# Patient Record
Sex: FEMALE | Race: WHITE | NOT HISPANIC OR LATINO | Employment: FULL TIME | URBAN - METROPOLITAN AREA
[De-identification: names, ages, dates, MRNs, and addresses within clinical notes are randomized per-mention and may not be internally consistent; named-entity substitution may affect disease eponyms.]

---

## 2018-09-25 ENCOUNTER — HOSPITAL ENCOUNTER (EMERGENCY)
Facility: HOSPITAL | Age: 28
Discharge: HOME | End: 2018-09-25
Attending: EMERGENCY MEDICINE | Admitting: EMERGENCY MEDICINE
Payer: MEDICAID

## 2018-09-25 VITALS
OXYGEN SATURATION: 100 % | TEMPERATURE: 99 F | HEART RATE: 92 BPM | DIASTOLIC BLOOD PRESSURE: 71 MMHG | RESPIRATION RATE: 18 BRPM | SYSTOLIC BLOOD PRESSURE: 105 MMHG | BODY MASS INDEX: 19.99 KG/M2 | WEIGHT: 120 LBS | HEIGHT: 65 IN

## 2018-09-25 DIAGNOSIS — O20.9 VAGINAL BLEEDING BEFORE 22 WEEKS GESTATION: Primary | ICD-10-CM

## 2018-09-25 LAB
ABO + RH BLD: NORMAL
B-HCG UR QL: POSITIVE
BACTERIA #/AREA URNS AUTO: NORMAL /HPF
BILIRUB UR QL STRIP: NEGATIVE
BLD GP AB SCN CELLS X3 SERPL QL: NORMAL
CLARITY UR REFRACT.AUTO: CLEAR
COLOR UR AUTO: YELLOW
CTP QC/QA: YES
GLUCOSE UR QL STRIP: NEGATIVE
HGB UR QL STRIP: ABNORMAL
KETONES UR QL STRIP: NEGATIVE
LEUKOCYTE ESTERASE UR QL STRIP: NEGATIVE
MICROSCOPIC COMMENT: NORMAL
NITRITE UR QL STRIP: NEGATIVE
PH UR STRIP: 5 [PH] (ref 5–8)
PROT UR QL STRIP: NEGATIVE
RBC #/AREA URNS AUTO: 2 /HPF (ref 0–4)
SP GR UR STRIP: 1.02 (ref 1–1.03)
SQUAMOUS #/AREA URNS AUTO: 2 /HPF
URN SPEC COLLECT METH UR: ABNORMAL
UROBILINOGEN UR STRIP-ACNC: NEGATIVE EU/DL
WBC #/AREA URNS AUTO: 1 /HPF (ref 0–5)

## 2018-09-25 PROCEDURE — 81001 URINALYSIS AUTO W/SCOPE: CPT

## 2018-09-25 PROCEDURE — 99283 EMERGENCY DEPT VISIT LOW MDM: CPT | Mod: 25

## 2018-09-25 PROCEDURE — 81025 URINE PREGNANCY TEST: CPT | Performed by: EMERGENCY MEDICINE

## 2018-09-25 PROCEDURE — 99283 EMERGENCY DEPT VISIT LOW MDM: CPT | Mod: ,,, | Performed by: PHYSICIAN ASSISTANT

## 2018-09-25 PROCEDURE — 86850 RBC ANTIBODY SCREEN: CPT

## 2018-09-25 NOTE — DISCHARGE INSTRUCTIONS
Please follow-up at [facility] with your OBGYN this week for evaluation.   Please return to the ED if you develop worsening vaginal bleeding, fever, or worsening abdominal pain    Our goal in the emergency department is to always give you outstanding care and exceptional service. You may receive a survey by mail or e-mail in the next week regarding your experience in our ED. We would greatly appreciate your completing and returning the survey. Your feedback provides us with a way to recognize our staff who give very good care and it helps us learn how to improve when your experience was below our aspiration of excellence.

## 2018-09-25 NOTE — ED TRIAGE NOTES
Patient states 6 weeks gestation by urine test, states brown vaginal spotting and cramping pain to LLQ, worse with standing for extended periods, denies fever, positive nausea, no emesis, less than 1 pad per hour.

## 2018-09-25 NOTE — ED NOTES
Patient identifiers verified and correct for Ms Senior  C/C: Vaginal bleeding, 6 months gestation  APPEARANCE: awake and alert in NAD.  SKIN: warm, dry and intact. No breakdown or bruising.  MUSCULOSKELETAL: Patient moving all extremities spontaneously, no obvious swelling or deformities noted. Ambulates independently.  RESPIRATORY: Denies shortness of breath.Respirations unlabored. Denies fever  CARDIAC: Denies CP, 2+ distal pulses; no peripheral edema  ABDOMEN: Abd pain to LLQ, positive nausea, no emesis  : voids spontaneously, denies difficulty, states brown vaginal drainage  Neurologic: AAO x 4; follows commands equal strength in all extremities; denies numbness/tingling. Denies dizziness

## 2018-09-25 NOTE — ED PROVIDER NOTES
Encounter Date: 9/25/2018       History     Chief Complaint   Patient presents with    Vaginal Bleeding     about 6 weeks preg at [facility], spotting and pain on L side of abd     28 year old female presenting to the ED with the chief complaint of vaginal bleeding. Patient reports she is 6 weeks pregnant from her LMP. This is her first pregnancy. She reports developing vaginal bleeding for the past 3 days. She reports having dark red spotting in her underwear initially 3 days ago. She reports noticing dark brown blood on the toilet paper after using the bathroom for the past 2 days. She reports developing LLQ abdominal cramping earlier today. She reports only having the LLQ abdominal cramping when she stands up. She denies having abdominal pain currently. She reports talking to her OBGYN's office who advised her to come to the ED for evaluation. She denies fever, nausea, vomiting, dysuria, hematuria, diarrhea, constipation, melena, hematochezia, vaginal discharge, concerns for STD.           Review of patient's allergies indicates:  No Known Allergies  History reviewed. No pertinent past medical history.  History reviewed. No pertinent surgical history.  History reviewed. No pertinent family history.  Social History     Tobacco Use    Smoking status: Never Smoker    Smokeless tobacco: Never Used   Substance Use Topics    Alcohol use: No     Frequency: Never    Drug use: No     Review of Systems   Constitutional: Negative for chills, diaphoresis and fever.   HENT: Negative for congestion, sore throat and trouble swallowing.    Eyes: Negative for pain and redness.   Respiratory: Negative for cough and shortness of breath.    Cardiovascular: Negative for chest pain.   Gastrointestinal: Negative for abdominal pain, diarrhea, nausea and vomiting.   Genitourinary: Positive for vaginal bleeding. Negative for dysuria, flank pain, hematuria and vaginal discharge.   Musculoskeletal: Negative for back pain, neck  pain and neck stiffness.   Skin: Negative for rash and wound.   Neurological: Negative for weakness, light-headedness and headaches.     Physical Exam     Initial Vitals [18 1314]   BP Pulse Resp Temp SpO2   115/77 100 16 98.3 °F (36.8 °C) 99 %      MAP       --         Physical Exam    Constitutional: She appears well-developed and well-nourished. She is not diaphoretic. No distress.   HENT:   Head: Normocephalic and atraumatic.   Mouth/Throat: Oropharynx is clear and moist. No oropharyngeal exudate.   Eyes: EOM are normal. Pupils are equal, round, and reactive to light.   Neck: Normal range of motion. Neck supple.   Cardiovascular: Normal rate and regular rhythm.   Pulmonary/Chest: Breath sounds normal. No respiratory distress. She has no wheezes.   Abdominal: Soft. Bowel sounds are normal. She exhibits no distension. There is no tenderness.   Musculoskeletal: Normal range of motion. She exhibits no edema or tenderness.   Neurological: She is alert and oriented to person, place, and time. She has normal strength. No cranial nerve deficit or sensory deficit.   Skin: Skin is warm and dry. No erythema.       ED Course   Procedures  Labs Reviewed   URINALYSIS, REFLEX TO URINE CULTURE - Abnormal; Notable for the following components:       Result Value    Occult Blood UA 2+ (*)     All other components within normal limits    Narrative:     cup  Preferred Collection Type->Urine, Clean Catch   POCT URINE PREGNANCY - Abnormal; Notable for the following components:    POC Preg Test, Ur Positive (*)     All other components within normal limits   URINALYSIS MICROSCOPIC    Narrative:     cup  Preferred Collection Type->Urine, Clean Catch   TYPE & SCREEN - OB PROFILE          Imaging Results    None                APC / Resident Notes:   28 year old  female, estimates 6 weeks by LMP, presenting to the ED c/o vaginal spotting for 3 days. DDx includes but not limited to IUP, threatened miscarriage, ectopic pregnancy,  UTI. Will conduct bedside ultrasound. Will check UA and Rh status.     Work-up shows +UPT. Type O+ blood type. No UTI on UA. Bedside U/S reveals +IUP with yolk sac. No fetal pole visualized, but pregnancy early with gestation age estimated to be 6 weeks.     Patient stable for discharge to have an official U/S with OBGYN and she reports having an appointment to establish care next week. Return to ED and bleeding precautions given. Patient expresses understanding and agreeable to the plan. I have discussed the care of this patient with my supervising physician.          Attending Attestation:     Physician Attestation Statement for NP/PA:   I have conducted a face to face encounter with this patient in addition to the NP/PA, due to Medical Complexity    Other NP/PA Attestation Additions:    History of Present Illness: 29 yo f, 6 WGA by dates, vag spotting    I performed bedside sono, + IUP, + yolk sac, unable to visualize fetal pole or FH but pregnancy very early    Will check Rh status and safe for d/c home, has OB f/u next week and can get official sono as outpatient    Bleeding precautions given           Attending ED Notes:   3:46 PM   O+ blood type  Will d/c home             Clinical Impression:   The encounter diagnosis was Vaginal bleeding before 22 weeks gestation.      Disposition:   Disposition: Discharged  Condition: Stable                        Daryl Montes PA-C  09/25/18 [id number]

## 2018-10-01 ENCOUNTER — OFFICE VISIT (OUTPATIENT)
Dept: OBSTETRICS AND GYNECOLOGY | Facility: CLINIC | Age: 28
End: 2018-10-01
Payer: MEDICAID

## 2018-10-01 VITALS
WEIGHT: 122.38 LBS | HEIGHT: 65 IN | SYSTOLIC BLOOD PRESSURE: 120 MMHG | BODY MASS INDEX: 20.39 KG/M2 | DIASTOLIC BLOOD PRESSURE: 60 MMHG

## 2018-10-01 DIAGNOSIS — Z32.00 ENCOUNTER FOR CONFIRMATION OF PREGNANCY TEST RESULT WITH PHYSICAL EXAMINATION: Primary | ICD-10-CM

## 2018-10-01 PROCEDURE — 99203 OFFICE O/P NEW LOW 30 MIN: CPT | Mod: S$PBB,,, | Performed by: OBSTETRICS & GYNECOLOGY

## 2018-10-01 PROCEDURE — 99999 PR PBB SHADOW E&M-EST. PATIENT-LVL III: CPT | Mod: PBBFAC,,, | Performed by: OBSTETRICS & GYNECOLOGY

## 2018-10-01 PROCEDURE — 99213 OFFICE O/P EST LOW 20 MIN: CPT | Mod: PBBFAC | Performed by: OBSTETRICS & GYNECOLOGY

## 2018-10-02 NOTE — PROGRESS NOTES
"[facility]  Ambulatory Clinic  Obstetrics & Gynecology    Visit Date:  10/1/2018     Chief Complaint:  Missed my period    History of Present Illness:      April Nadeen is a 28 y.o. , UPT positive today, here with c/o missed period.    Patient's last menstrual period was 2018 (exact date).      Pt has no major complaints today and denies any vaginal bleeding, discharge, pain, GI/ compliants.      Pt reports overall good health.    Past Medical History:      None      Past Surgical History:     None     Medications:      Prenatal vitamins     Allergies:      NKDA      Obstetric History:      G1, current    Gynecologic History:      Denies recent/active STI  Denies history abnormal Pap     Social History:     Denies tobacco, alcohol or illicit drug use  Current partner is father of baby  Denies domestic abuse     Family History:      Denies congenital anomalies, inherited syndromes, fetal aneuploidy    Review of Systems:      Constitutional:  No fever, fatigue  HENT:  No congestion, hearing changes  Eyes:  No visual disturbance  Respiratory:  No cough, shortness of breath  Cardiovascular:  No chest pain, leg swelling  Breast:  No lump, pain, nipple discharge, redness, skin changes  Gastrointestinal:  No abdominal pain, constipation, blood in stool   Genitourinary:  No dysuria, frequency  Endocrine:  No heat or cold intolerance  Musculoskeletal:  No back pain, arthralgias  Skin:  No rash, jaundice  Neurological:  No dizziness, weakness, headaches  Psychiatric/Behavioral:  No sleep disturbance, dysphoric mood     Physical Exam:     /60   Ht 5' 5" (1.651 m)   Wt 55.5 kg (122 lb 5.7 oz)   LMP 2018 (Exact Date)   BMI 20.36 kg/m²      GENERAL:  NAD. Well-nourished. A&Ox3.  HEENT:  NCAT, EOMI, moist mucus membranes.  Neck supple w/o masses.  BREAST:  Symmetric, no obvious masses, adenopathy, skin changes or nipple discharge.  LUNGS:  CTA-B.  HEART:  RRR, physiologic heart " sounds.  ABDOMEN:  Soft, non-tender. Normoactive BS.  No obvious organomegaly.    EXT:  Symmetric w/o cramping, claudication, or edema. +2 distal pulses. FROM.  SKIN:  No rashes  NEURO:  CN II - XII grossly intact bilaterally. +2 DTR.  PSYCH:  Mood & affect appropriate.       PELVIC:  Female external genitalia w/o any obvious lesions.  Adequate perineal body. Normal urethral meatus. No gross lymphadenopathy.    Vagina:  Pink, moist, well-rugated.  Vaginal vault with good support.  No obvious lesion.  No discharge noted.     Cervix:  No cervical motion tenderness, discharge, or obvious lesions.  Closed, thick, posterior.  Uterus:  Small, non-tender, normal contour.  Adnexa:  No masses or tenderness.    Rectal:  Declined.  No obvious external lesions.   Wet prep:  Negative    Chaperone present for exam.    Assessment:     28 y.o. , UPT positive, LMP 18, here for confirmation of pregnancy    Plan:    We discussed principles of prenatal care, weight gain goals, dietary/lifestyle modifications, pregnancy care instructions and precautions.  Prenatal educational material given to pt.  Continue prenatal vitamins.  SAB and ectopic precautions reviewed.      Return in 4 weeks to initiate prenatal care.  All questions answered, pt voiced understanding.      Octavio Donaldson MD

## 2018-10-29 ENCOUNTER — ROUTINE PRENATAL (OUTPATIENT)
Dept: OBSTETRICS AND GYNECOLOGY | Facility: CLINIC | Age: 28
End: 2018-10-29
Payer: MEDICAID

## 2018-10-29 ENCOUNTER — LAB VISIT (OUTPATIENT)
Dept: LAB | Facility: HOSPITAL | Age: 28
End: 2018-10-29
Attending: OBSTETRICS & GYNECOLOGY
Payer: MEDICAID

## 2018-10-29 VITALS
WEIGHT: 123.81 LBS | DIASTOLIC BLOOD PRESSURE: 78 MMHG | HEART RATE: 72 BPM | SYSTOLIC BLOOD PRESSURE: 102 MMHG | BODY MASS INDEX: 20.6 KG/M2

## 2018-10-29 DIAGNOSIS — Z3A.11 11 WEEKS GESTATION OF PREGNANCY: ICD-10-CM

## 2018-10-29 DIAGNOSIS — Z3A.11 11 WEEKS GESTATION OF PREGNANCY: Primary | ICD-10-CM

## 2018-10-29 DIAGNOSIS — Z36.89 ENCOUNTER TO ESTABLISH GESTATIONAL AGE USING ULTRASOUND: ICD-10-CM

## 2018-10-29 LAB
ALBUMIN SERPL BCP-MCNC: 4 G/DL
ALP SERPL-CCNC: 34 U/L
ALT SERPL W/O P-5'-P-CCNC: 14 U/L
ANION GAP SERPL CALC-SCNC: 9 MMOL/L
AST SERPL-CCNC: 14 U/L
BASOPHILS # BLD AUTO: 0.01 K/UL
BASOPHILS NFR BLD: 0.2 %
BILIRUB SERPL-MCNC: 0.7 MG/DL
BUN SERPL-MCNC: 9 MG/DL
CALCIUM SERPL-MCNC: 9.8 MG/DL
CHLORIDE SERPL-SCNC: 105 MMOL/L
CO2 SERPL-SCNC: 21 MMOL/L
CREAT SERPL-MCNC: 0.7 MG/DL
DIFFERENTIAL METHOD: ABNORMAL
EOSINOPHIL # BLD AUTO: 0 K/UL
EOSINOPHIL NFR BLD: 0.2 %
ERYTHROCYTE [DISTWIDTH] IN BLOOD BY AUTOMATED COUNT: 15.8 %
EST. GFR  (AFRICAN AMERICAN): >60 ML/MIN/1.73 M^2
EST. GFR  (NON AFRICAN AMERICAN): >60 ML/MIN/1.73 M^2
GIANT PLATELETS BLD QL SMEAR: PRESENT
GLUCOSE SERPL-MCNC: 100 MG/DL
HCT VFR BLD AUTO: 33.5 %
HGB BLD-MCNC: 11 G/DL
HYPOCHROMIA BLD QL SMEAR: ABNORMAL
LYMPHOCYTES # BLD AUTO: 1.1 K/UL
LYMPHOCYTES NFR BLD: 17.8 %
MCH RBC QN AUTO: 20 PG
MCHC RBC AUTO-ENTMCNC: 32.8 G/DL
MCV RBC AUTO: 61 FL
MONOCYTES # BLD AUTO: 0.3 K/UL
MONOCYTES NFR BLD: 4.5 %
NEUTROPHILS # BLD AUTO: 5 K/UL
NEUTROPHILS NFR BLD: 77.6 %
OVALOCYTES BLD QL SMEAR: ABNORMAL
PLATELET # BLD AUTO: 255 K/UL
PMV BLD AUTO: ABNORMAL FL
POLYCHROMASIA BLD QL SMEAR: ABNORMAL
POTASSIUM SERPL-SCNC: 3.4 MMOL/L
PROT SERPL-MCNC: 7.8 G/DL
RBC # BLD AUTO: 5.5 M/UL
SODIUM SERPL-SCNC: 135 MMOL/L
TOXIC GRANULES BLD QL SMEAR: PRESENT
WBC # BLD AUTO: 6.42 K/UL

## 2018-10-29 PROCEDURE — 81220 CFTR GENE COM VARIANTS: CPT

## 2018-10-29 PROCEDURE — 99999 PR PBB SHADOW E&M-EST. PATIENT-LVL III: CPT | Mod: PBBFAC,,, | Performed by: OBSTETRICS & GYNECOLOGY

## 2018-10-29 PROCEDURE — 86762 RUBELLA ANTIBODY: CPT

## 2018-10-29 PROCEDURE — 86703 HIV-1/HIV-2 1 RESULT ANTBDY: CPT

## 2018-10-29 PROCEDURE — 85025 COMPLETE CBC W/AUTO DIFF WBC: CPT

## 2018-10-29 PROCEDURE — 87340 HEPATITIS B SURFACE AG IA: CPT

## 2018-10-29 PROCEDURE — 86592 SYPHILIS TEST NON-TREP QUAL: CPT

## 2018-10-29 PROCEDURE — 83036 HEMOGLOBIN GLYCOSYLATED A1C: CPT

## 2018-10-29 PROCEDURE — 83021 HEMOGLOBIN CHROMOTOGRAPHY: CPT

## 2018-10-29 PROCEDURE — 80053 COMPREHEN METABOLIC PANEL: CPT

## 2018-10-29 PROCEDURE — 99213 OFFICE O/P EST LOW 20 MIN: CPT | Mod: TH,25,S$PBB, | Performed by: OBSTETRICS & GYNECOLOGY

## 2018-10-29 PROCEDURE — 76801 OB US < 14 WKS SINGLE FETUS: CPT | Mod: 26,S$PBB,, | Performed by: OBSTETRICS & GYNECOLOGY

## 2018-10-29 PROCEDURE — 99213 OFFICE O/P EST LOW 20 MIN: CPT | Mod: PBBFAC,TH | Performed by: OBSTETRICS & GYNECOLOGY

## 2018-10-29 PROCEDURE — 87086 URINE CULTURE/COLONY COUNT: CPT

## 2018-10-29 PROCEDURE — 87591 N.GONORRHOEAE DNA AMP PROB: CPT

## 2018-10-29 PROCEDURE — 76801 OB US < 14 WKS SINGLE FETUS: CPT | Mod: PBBFAC | Performed by: OBSTETRICS & GYNECOLOGY

## 2018-10-29 PROCEDURE — 86803 HEPATITIS C AB TEST: CPT

## 2018-10-29 NOTE — PROGRESS NOTES
"[facility]  Ambulatory Clinic  Obstetrics & Gynecology    Visit Date:  10/1/2018     Chief Complaint:  Missed my period    History of Present Illness:      April Nadeen is a 28 y.o. , UPT positive today, here with c/o missed period.    Patient's last menstrual period was 2018 (exact date).      Pt has no major complaints today and denies any vaginal bleeding, discharge, pain, GI/ compliants.      Pt reports overall good health.    Past Medical History:      None      Past Surgical History:     None     Medications:      Prenatal vitamins     Allergies:      NKDA      Obstetric History:      G1, current    Gynecologic History:      Denies recent/active STI  Denies history abnormal Pap     Social History:     Denies tobacco, alcohol or illicit drug use  Current partner is father of baby  Denies domestic abuse     Family History:      Denies congenital anomalies, inherited syndromes, fetal aneuploidy    Review of Systems:      Constitutional:  No fever, fatigue  HENT:  No congestion, hearing changes  Eyes:  No visual disturbance  Respiratory:  No cough, shortness of breath  Cardiovascular:  No chest pain, leg swelling  Breast:  No lump, pain, nipple discharge, redness, skin changes  Gastrointestinal:  No abdominal pain, constipation, blood in stool   Genitourinary:  No dysuria, frequency  Endocrine:  No heat or cold intolerance  Musculoskeletal:  No back pain, arthralgias  Skin:  No rash, jaundice  Neurological:  No dizziness, weakness, headaches  Psychiatric/Behavioral:  No sleep disturbance, dysphoric mood     Physical Exam:     /60   Ht 5' 5" (1.651 m)   Wt 55.5 kg (122 lb 5.7 oz)   LMP 2018 (Exact Date)   BMI 20.36 kg/m²      GENERAL:  NAD. Well-nourished. A&Ox3.  HEENT:  NCAT, EOMI, moist mucus membranes.  Neck supple w/o masses.  BREAST:  Symmetric, no obvious masses, adenopathy, skin changes or nipple discharge.  LUNGS:  CTA-B.  HEART:  RRR, physiologic heart " sounds.  ABDOMEN:  Soft, non-tender. Normoactive BS.  No obvious organomegaly.    EXT:  Symmetric w/o cramping, claudication, or edema. +2 distal pulses. FROM.  SKIN:  No rashes  NEURO:  CN II - XII grossly intact bilaterally. +2 DTR.  PSYCH:  Mood & affect appropriate.       PELVIC:  Female external genitalia w/o any obvious lesions.  Adequate perineal body. Normal urethral meatus. No gross lymphadenopathy.    Vagina:  Pink, moist, well-rugated.  Vaginal vault with good support.  No obvious lesion.  No discharge noted.     Cervix:  No cervical motion tenderness, discharge, or obvious lesions.  Closed, thick, posterior.  Uterus:  Small, non-tender, normal contour.  Adnexa:  No masses or tenderness.    Rectal:  Declined.  No obvious external lesions.   Wet prep:  Negative    Chaperone present for exam.    Assessment:     28 y.o. , UPT positive, LMP 18, here for confirmation of pregnancy    Plan:    We discussed principles of prenatal care, weight gain goals, dietary/lifestyle modifications, pregnancy care instructions and precautions.  Prenatal educational material given to pt.  Continue prenatal vitamins.  SAB and ectopic precautions reviewed.      Return in 4 weeks to initiate prenatal care.  All questions answered, pt voiced understanding.      Octavio Donaldson MD    _____________________________________________________________________    10/29/2018    11w0d here for initial OB visit and dating ultrasound.  Pt has no major complaints today.  Dating u/s shows single live IUP at 10w6d with KIA 2019.  Limitation of today's u/s exam discussed.  Order initial OB labs.  Pt declined first trimester aneuploidy screening, and state she would not terminate the pregnancy for any reasons.  Principles of prenatal care and precautions reviewed.  Return 4 wks.  Voiced understanding.  Significant other present for visit.    Octavio Donaldson MD  _____________________________________________________________________

## 2018-10-30 LAB
ESTIMATED AVG GLUCOSE: 91 MG/DL
HBA1C MFR BLD HPLC: 4.8 %
HBV SURFACE AG SERPL QL IA: NEGATIVE
HCV AB SERPL QL IA: NEGATIVE
HIV 1+2 AB+HIV1 P24 AG SERPL QL IA: NEGATIVE
RPR SER QL: NORMAL

## 2018-10-31 LAB
BACTERIA UR CULT: NORMAL
C TRACH DNA SPEC QL NAA+PROBE: NOT DETECTED
HGB A2 MFR BLD HPLC: 5.7 %
HGB FRACT BLD ELPH-IMP: ABNORMAL
HGB FRACT BLD ELPH-IMP: ABNORMAL
N GONORRHOEA DNA SPEC QL NAA+PROBE: NOT DETECTED
RUBV IGG SER-ACNC: 31.1 IU/ML
RUBV IGG SER-IMP: REACTIVE

## 2018-11-02 LAB — CFTR MUT ANL BLD/T: NORMAL

## 2018-11-13 ENCOUNTER — HOSPITAL ENCOUNTER (EMERGENCY)
Facility: HOSPITAL | Age: 28
Discharge: HOME | End: 2018-11-13
Attending: EMERGENCY MEDICINE
Payer: MEDICAID

## 2018-11-13 VITALS
DIASTOLIC BLOOD PRESSURE: 55 MMHG | BODY MASS INDEX: 20.66 KG/M2 | SYSTOLIC BLOOD PRESSURE: 94 MMHG | HEART RATE: 92 BPM | WEIGHT: 124 LBS | HEIGHT: 65 IN | RESPIRATION RATE: 18 BRPM | TEMPERATURE: 98 F | OXYGEN SATURATION: 100 %

## 2018-11-13 DIAGNOSIS — O21.9 NAUSEA AND VOMITING DURING PREGNANCY: Primary | ICD-10-CM

## 2018-11-13 LAB
ALBUMIN SERPL BCP-MCNC: 4 G/DL
ALP SERPL-CCNC: 37 U/L
ALT SERPL W/O P-5'-P-CCNC: 13 U/L
ANION GAP SERPL CALC-SCNC: 8 MMOL/L
AST SERPL-CCNC: 19 U/L
B-HCG UR QL: POSITIVE
BASOPHILS # BLD AUTO: 0.01 K/UL
BASOPHILS NFR BLD: 0.1 %
BILIRUB SERPL-MCNC: 0.8 MG/DL
BILIRUB UR QL STRIP: NEGATIVE
BUN SERPL-MCNC: 10 MG/DL
CALCIUM SERPL-MCNC: 9.4 MG/DL
CHLORIDE SERPL-SCNC: 105 MMOL/L
CLARITY UR: CLEAR
CO2 SERPL-SCNC: 21 MMOL/L
COLOR UR: YELLOW
CREAT SERPL-MCNC: 0.7 MG/DL
CTP QC/QA: YES
DIFFERENTIAL METHOD: ABNORMAL
EOSINOPHIL # BLD AUTO: 0 K/UL
EOSINOPHIL NFR BLD: 0.2 %
ERYTHROCYTE [DISTWIDTH] IN BLOOD BY AUTOMATED COUNT: 15.8 %
EST. GFR  (AFRICAN AMERICAN): >60 ML/MIN/1.73 M^2
EST. GFR  (NON AFRICAN AMERICAN): >60 ML/MIN/1.73 M^2
GLUCOSE SERPL-MCNC: 83 MG/DL
GLUCOSE UR QL STRIP: NEGATIVE
HCT VFR BLD AUTO: 34.4 %
HGB BLD-MCNC: 11.3 G/DL
HGB UR QL STRIP: NEGATIVE
KETONES UR QL STRIP: ABNORMAL
LEUKOCYTE ESTERASE UR QL STRIP: NEGATIVE
LIPASE SERPL-CCNC: 26 U/L
LYMPHOCYTES # BLD AUTO: 0.5 K/UL
LYMPHOCYTES NFR BLD: 5.2 %
MCH RBC QN AUTO: 20.3 PG
MCHC RBC AUTO-ENTMCNC: 32.8 G/DL
MCV RBC AUTO: 62 FL
MONOCYTES # BLD AUTO: 0.4 K/UL
MONOCYTES NFR BLD: 3.8 %
NEUTROPHILS # BLD AUTO: 9.3 K/UL
NEUTROPHILS NFR BLD: 90.9 %
NITRITE UR QL STRIP: NEGATIVE
PH UR STRIP: 6 [PH] (ref 5–8)
PLATELET # BLD AUTO: 201 K/UL
PMV BLD AUTO: ABNORMAL FL
POTASSIUM SERPL-SCNC: 3.8 MMOL/L
PROT SERPL-MCNC: 7.8 G/DL
PROT UR QL STRIP: NEGATIVE
RBC # BLD AUTO: 5.58 M/UL
SODIUM SERPL-SCNC: 134 MMOL/L
SP GR UR STRIP: 1.02 (ref 1–1.03)
URN SPEC COLLECT METH UR: ABNORMAL
UROBILINOGEN UR STRIP-ACNC: NEGATIVE EU/DL
WBC # BLD AUTO: 10.28 K/UL

## 2018-11-13 PROCEDURE — 96361 HYDRATE IV INFUSION ADD-ON: CPT

## 2018-11-13 PROCEDURE — 83690 ASSAY OF LIPASE: CPT

## 2018-11-13 PROCEDURE — 81025 URINE PREGNANCY TEST: CPT | Performed by: PHYSICIAN ASSISTANT

## 2018-11-13 PROCEDURE — 81003 URINALYSIS AUTO W/O SCOPE: CPT

## 2018-11-13 PROCEDURE — 96374 THER/PROPH/DIAG INJ IV PUSH: CPT

## 2018-11-13 PROCEDURE — 25000003 PHARM REV CODE 250: Performed by: PHYSICIAN ASSISTANT

## 2018-11-13 PROCEDURE — 99283 EMERGENCY DEPT VISIT LOW MDM: CPT

## 2018-11-13 PROCEDURE — 85025 COMPLETE CBC W/AUTO DIFF WBC: CPT

## 2018-11-13 PROCEDURE — 80053 COMPREHEN METABOLIC PANEL: CPT

## 2018-11-13 PROCEDURE — 63600175 PHARM REV CODE 636 W HCPCS: Performed by: PHYSICIAN ASSISTANT

## 2018-11-13 RX ORDER — ONDANSETRON 2 MG/ML
4 INJECTION INTRAMUSCULAR; INTRAVENOUS
Status: COMPLETED | OUTPATIENT
Start: 2018-11-13 | End: 2018-11-13

## 2018-11-13 RX ORDER — ONDANSETRON 4 MG/1
4 TABLET, ORALLY DISINTEGRATING ORAL EVERY 6 HOURS PRN
Qty: 4 TABLET | Refills: 0 | Status: SHIPPED | OUTPATIENT
Start: 2018-11-13 | End: 2018-11-15 | Stop reason: SDUPTHER

## 2018-11-13 RX ADMIN — ONDANSETRON 4 MG: 2 INJECTION INTRAMUSCULAR; INTRAVENOUS at 01:11

## 2018-11-13 RX ADMIN — SODIUM CHLORIDE 1000 ML: 0.9 INJECTION, SOLUTION INTRAVENOUS at 01:11

## 2018-11-13 NOTE — ED PROVIDER NOTES
Encounter Date: 2018  SORT:  This is a 28 year old female who is approximately 13 weeks pregnant who presents complaining of vomiting that started this morning.     History     Chief Complaint   Patient presents with    Emesis     13 weeks gestation.  vomiting since approx 6am today.  denies other symptoms.      Chief Complaint:  Vomiting  History of  Present Illness: History obtained from patient. This 28 y.o. female A0 who is approximately 13 weeks gestation with no known past medical history presents to the ED complaining of vomiting that began upon awakening at 6:00 a.m. this morning.  Patient states she is followed by her OB, Dr. Octavio Donaldson, with last appointment on 10/31/2018.  Patient had a transvaginal ultrasound confirmed IUP with estimated gestational age of 10 weeks 6 days.  Patient states she had some nausea and vomiting at the beginning of the pregnancy but reports no persisting episodes.  She also complains of constipation (LBM 3 days ago) and urinary frequency. She denies abdominal pain, diarrhea, dysuria, hematuria, vaginal discharge, vaginal bleeding.          Review of patient's allergies indicates:   Allergen Reactions    Latex, natural rubber Itching and Rash     History reviewed. No pertinent past medical history.  History reviewed. No pertinent surgical history.  Family History   Problem Relation Age of Onset    Diabetes Paternal Grandfather      Social History     Tobacco Use    Smoking status: Never Smoker    Smokeless tobacco: Never Used   Substance Use Topics    Alcohol use: No     Frequency: Never    Drug use: No     Review of Systems   Constitutional: Negative for chills and fever.   HENT: Negative for sore throat.    Eyes: Negative for pain.   Respiratory: Negative for cough and shortness of breath.    Cardiovascular: Negative for chest pain.   Gastrointestinal: Positive for constipation, nausea and vomiting. Negative for abdominal pain and diarrhea.   Genitourinary:  Positive for frequency. Negative for dysuria, hematuria, vaginal bleeding and vaginal discharge.   Musculoskeletal: Negative for back pain.   Skin: Negative for rash.   Neurological: Negative for dizziness, weakness and headaches.       Physical Exam     Initial Vitals [11/13/18 1248]   BP Pulse Resp Temp SpO2   119/72 107 16 98.5 °F (36.9 °C) 98 %      MAP       --         Physical Exam    Nursing note and vitals reviewed.  Constitutional: She appears well-developed and well-nourished. No distress.   HENT:   Head: Normocephalic and atraumatic.   Eyes: EOM are normal. Pupils are equal, round, and reactive to light.   Neck: Normal range of motion. Neck supple.   Cardiovascular: Regular rhythm and normal heart sounds. Tachycardia present.  Exam reveals no gallop and no friction rub.    No murmur heard.  Pulmonary/Chest: Breath sounds normal. No respiratory distress. She has no wheezes. She has no rhonchi. She has no rales.   Abdominal: Soft. Bowel sounds are normal. There is no tenderness. There is no rebound and no guarding.   Gravid uterus below the umbilicus   Musculoskeletal: Normal range of motion.   Neurological: She is alert and oriented to person, place, and time.   Skin: Skin is warm and dry.   Psychiatric: She has a normal mood and affect.         ED Course   Procedures  Labs Reviewed   CBC W/ AUTO DIFFERENTIAL - Abnormal; Notable for the following components:       Result Value    RBC 5.58 (*)     Hemoglobin 11.3 (*)     Hematocrit 34.4 (*)     MCV 62 (*)     MCH 20.3 (*)     RDW 15.8 (*)     Gran # (ANC) 9.3 (*)     Lymph # 0.5 (*)     Gran% 90.9 (*)     Lymph% 5.2 (*)     Mono% 3.8 (*)     All other components within normal limits   COMPREHENSIVE METABOLIC PANEL - Abnormal; Notable for the following components:    Sodium 134 (*)     CO2 21 (*)     Alkaline Phosphatase 37 (*)     All other components within normal limits   URINALYSIS, REFLEX TO URINE CULTURE - Abnormal; Notable for the following  components:    Ketones, UA Trace (*)     All other components within normal limits    Narrative:     Preferred Collection Type->Urine, Clean Catch   POCT URINE PREGNANCY - Abnormal; Notable for the following components:    POC Preg Test, Ur Positive (*)     All other components within normal limits   LIPASE          Imaging Results    None          Medical Decision Making:   Clinical Tests:   Lab Tests: Ordered and Reviewed  The following lab test(s) were unremarkable: CBC, CMP, Urinalysis and Lipase  ED Management:  This is an evaluation of a 28 y.o. Female A0 with confirmed IUP via transvaginal ultrasound who presents to the ED for some nausea and vomiting.  Patient is tachycardic.  Vital signs otherwise stable. Afebrile.  Patient is nontoxic appearing and in no acute distress. Abdomen is soft and nontender to palpation. The patient is tachycardic on exam. Lungs are clear to auscultation.  Heart sounds are normal. Patient given IV fluids and Zofran in the emergency department.  CBC shows no anemia or leukocytosis.  CMP shows no gross electrolyte abnormalities.  The lipase within normal limits. UA shows no signs of infection.  Etiology of nausea vomiting likely hyperemesis gravidarum.  Patient's heart rate 92 prior to discharge.  Patient will be Discharged home with Zofran and instructed follow up with her OB tomorrow.  Patient informed of the test results.     Patient given return precautions and instructed to return to the emergency department for any new or worsening symptoms. Patient verbalized understanding and agreed with plan.     I discussed the case with Dr. Gu who is in agreement with my assessment and plan.                        Clinical Impression:   The encounter diagnosis was Nausea and vomiting during pregnancy.                             Tha Donaldson PA-C  [phone]

## 2018-11-13 NOTE — ED NOTES
The pt tolerated the water well and given apple juice. The provider is at the pt's side and the POC discussed. The pt verbalized an understanding.

## 2018-11-14 ENCOUNTER — ROUTINE PRENATAL (OUTPATIENT)
Dept: OBSTETRICS AND GYNECOLOGY | Facility: CLINIC | Age: 28
End: 2018-11-14
Payer: MEDICAID

## 2018-11-14 VITALS
BODY MASS INDEX: 20.89 KG/M2 | SYSTOLIC BLOOD PRESSURE: 100 MMHG | DIASTOLIC BLOOD PRESSURE: 68 MMHG | WEIGHT: 125.56 LBS

## 2018-11-14 DIAGNOSIS — O21.9 NAUSEA AND VOMITING DURING PREGNANCY: ICD-10-CM

## 2018-11-14 DIAGNOSIS — Z3A.13 13 WEEKS GESTATION OF PREGNANCY: Primary | ICD-10-CM

## 2018-11-14 PROCEDURE — 99213 OFFICE O/P EST LOW 20 MIN: CPT | Mod: TH,S$PBB,, | Performed by: OBSTETRICS & GYNECOLOGY

## 2018-11-14 PROCEDURE — 99212 OFFICE O/P EST SF 10 MIN: CPT | Mod: PBBFAC,TH | Performed by: OBSTETRICS & GYNECOLOGY

## 2018-11-14 PROCEDURE — 99999 PR PBB SHADOW E&M-EST. PATIENT-LVL II: CPT | Mod: PBBFAC,,, | Performed by: OBSTETRICS & GYNECOLOGY

## 2018-11-14 RX ORDER — PROMETHAZINE HYDROCHLORIDE 12.5 MG/1
12.5 TABLET ORAL EVERY 6 HOURS PRN
Qty: 20 TABLET | Refills: 0 | Status: ON HOLD | OUTPATIENT
Start: 2018-11-14 | End: 2019-05-18 | Stop reason: HOSPADM

## 2018-11-14 RX ORDER — ONDANSETRON 4 MG/1
4 TABLET, FILM COATED ORAL DAILY PRN
Qty: 30 TABLET | Refills: 1 | Status: ON HOLD | OUTPATIENT
Start: 2018-11-14 | End: 2019-05-18 | Stop reason: HOSPADM

## 2018-11-14 NOTE — PROGRESS NOTES
13w2d here for OB visit.    Pt here for f/u from ED for nausea/vomiting.  Feels better today.  Requesting refill of zofran/phenergan.  Pt requesting refill of zofran/phenergan to use prn for nausea/vomiting.   Risks, benefits, and alternatives to zofran reviewed. Dietary advice.   Cautioned on drossiness, no driving or operating machinery.    Otherwise, pt has no major complaints today.  Denies vaginal bleeding, leakage of fluid, or contractions.  Abd soft/NT.    Initial OB lab results d/w pt.  Precautions reviewed.  Return 4 wks.  Voiced understanding.

## 2018-11-15 ENCOUNTER — PATIENT MESSAGE (OUTPATIENT)
Dept: OBSTETRICS AND GYNECOLOGY | Facility: CLINIC | Age: 28
End: 2018-11-15

## 2018-11-15 DIAGNOSIS — O21.9 NAUSEA AND VOMITING DURING PREGNANCY: Primary | ICD-10-CM

## 2018-11-15 RX ORDER — ONDANSETRON 4 MG/1
4 TABLET, ORALLY DISINTEGRATING ORAL EVERY 6 HOURS PRN
Qty: 30 TABLET | Refills: 1 | Status: ON HOLD | OUTPATIENT
Start: 2018-11-15 | End: 2019-05-18 | Stop reason: HOSPADM

## 2018-12-10 ENCOUNTER — LAB VISIT (OUTPATIENT)
Dept: LAB | Facility: HOSPITAL | Age: 28
End: 2018-12-10
Attending: OBSTETRICS & GYNECOLOGY
Payer: MEDICAID

## 2018-12-10 ENCOUNTER — ROUTINE PRENATAL (OUTPATIENT)
Dept: OBSTETRICS AND GYNECOLOGY | Facility: CLINIC | Age: 28
End: 2018-12-10
Payer: MEDICAID

## 2018-12-10 VITALS
SYSTOLIC BLOOD PRESSURE: 102 MMHG | DIASTOLIC BLOOD PRESSURE: 68 MMHG | BODY MASS INDEX: 21.68 KG/M2 | WEIGHT: 130.31 LBS

## 2018-12-10 DIAGNOSIS — Z3A.17 17 WEEKS GESTATION OF PREGNANCY: Primary | ICD-10-CM

## 2018-12-10 DIAGNOSIS — Z3A.17 17 WEEKS GESTATION OF PREGNANCY: ICD-10-CM

## 2018-12-10 PROCEDURE — 99213 OFFICE O/P EST LOW 20 MIN: CPT | Mod: PBBFAC | Performed by: OBSTETRICS & GYNECOLOGY

## 2018-12-10 PROCEDURE — 99999 PR PBB SHADOW E&M-EST. PATIENT-LVL III: CPT | Mod: PBBFAC,,, | Performed by: OBSTETRICS & GYNECOLOGY

## 2018-12-10 PROCEDURE — 36415 COLL VENOUS BLD VENIPUNCTURE: CPT

## 2018-12-10 PROCEDURE — 81511 FTL CGEN ABNOR FOUR ANAL: CPT

## 2018-12-10 PROCEDURE — 99213 OFFICE O/P EST LOW 20 MIN: CPT | Mod: TH,S$PBB,, | Performed by: OBSTETRICS & GYNECOLOGY

## 2018-12-10 NOTE — PROGRESS NOTES
17w0d here for OB visit.  Pt has no major complaints today.  Reports active fetus.  Denies vaginal bleeding, leakage of fluid, or contractions.  Order quad screen today.  Anatomy ultrasound next visit.  Precautions reviewed.  Return 4 wks.  Voiced understanding.  Significant other present for visit.

## 2018-12-12 LAB
# FETUSES US: NORMAL
2ND TRIMESTER 4 SCREEN PNL SERPL: NEGATIVE
2ND TRIMESTER 4 SCREEN SERPL-IMP: NORMAL
AFP MOM SERPL: 0.93
AFP SERPL-MCNC: 35.8 NG/ML
AGE AT DELIVERY: 29
B-HCG MOM SERPL: 1.56
B-HCG SERPL-ACNC: 47.1 IU/ML
FET TS 21 RISK FROM MAT AGE: NORMAL
GA (DAYS): 6 D
GA (WEEKS): 16 WK
GA METHOD: NORMAL
IDDM PATIENT QL: NORMAL
INHIBIN A MOM SERPL: 1.01
INHIBIN A SERPL-MCNC: 173.8 PG/ML
SMOKING STATUS FTND: NO
TS 18 RISK FETUS: NORMAL
TS 21 RISK FETUS: NORMAL
U ESTRIOL MOM SERPL: 0.87
U ESTRIOL SERPL-MCNC: 0.92 NG/ML

## 2019-01-07 ENCOUNTER — ROUTINE PRENATAL (OUTPATIENT)
Dept: OBSTETRICS AND GYNECOLOGY | Facility: CLINIC | Age: 29
End: 2019-01-07
Payer: MEDICAID

## 2019-01-07 VITALS
SYSTOLIC BLOOD PRESSURE: 112 MMHG | DIASTOLIC BLOOD PRESSURE: 70 MMHG | BODY MASS INDEX: 22.69 KG/M2 | WEIGHT: 136.38 LBS

## 2019-01-07 DIAGNOSIS — Z36.3 ANTENATAL SCREENING FOR MALFORMATION USING ULTRASONICS: ICD-10-CM

## 2019-01-07 DIAGNOSIS — Z3A.21 21 WEEKS GESTATION OF PREGNANCY: Primary | ICD-10-CM

## 2019-01-07 PROCEDURE — 76805 OB US >/= 14 WKS SNGL FETUS: CPT | Mod: 26,S$PBB,, | Performed by: OBSTETRICS & GYNECOLOGY

## 2019-01-07 PROCEDURE — 76805 PR US, OB 14+WKS, TRANSABD, SINGLE GESTATION: ICD-10-PCS | Mod: 26,S$PBB,, | Performed by: OBSTETRICS & GYNECOLOGY

## 2019-01-07 PROCEDURE — 99999 PR PBB SHADOW E&M-EST. PATIENT-LVL III: CPT | Mod: PBBFAC,,, | Performed by: OBSTETRICS & GYNECOLOGY

## 2019-01-07 PROCEDURE — 99213 OFFICE O/P EST LOW 20 MIN: CPT | Mod: TH,25,S$PBB, | Performed by: OBSTETRICS & GYNECOLOGY

## 2019-01-07 PROCEDURE — 99213 PR OFFICE/OUTPT VISIT, EST, LEVL III, 20-29 MIN: ICD-10-PCS | Mod: TH,25,S$PBB, | Performed by: OBSTETRICS & GYNECOLOGY

## 2019-01-07 PROCEDURE — 99213 OFFICE O/P EST LOW 20 MIN: CPT | Mod: PBBFAC,TH | Performed by: OBSTETRICS & GYNECOLOGY

## 2019-01-07 PROCEDURE — 99999 PR PBB SHADOW E&M-EST. PATIENT-LVL III: ICD-10-PCS | Mod: PBBFAC,,, | Performed by: OBSTETRICS & GYNECOLOGY

## 2019-01-07 PROCEDURE — 76805 OB US >/= 14 WKS SNGL FETUS: CPT | Mod: PBBFAC | Performed by: OBSTETRICS & GYNECOLOGY

## 2019-01-07 NOTE — PROGRESS NOTES
21w0d here for OB visit and anatomy ultrasound.    No major complaints today.  Reports active fetus.  Denies vaginal bleeding, leakage of fluid, or contractions.    Anatomy u/s shows normal appearing fetus with dating c/w established EDC.  Limitation of today's u/s exam discussed.  Quad screen negative.     Precautions reviewed.  Return 4 wks.  Voiced understanding.  Significant other present for visit.

## 2019-01-16 ENCOUNTER — TELEPHONE (OUTPATIENT)
Dept: OBSTETRICS AND GYNECOLOGY | Facility: CLINIC | Age: 29
End: 2019-01-16

## 2019-01-16 ENCOUNTER — NURSE TRIAGE (OUTPATIENT)
Dept: ADMINISTRATIVE | Facility: CLINIC | Age: 29
End: 2019-01-16

## 2019-01-16 NOTE — TELEPHONE ENCOUNTER
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 She sat down to rest and calling now to report cramping a 1/10 and feeling of heat is passing. Denied other sx's.  22 wks pregnant. Onset of sx's 15 min prior to call      Reason for Disposition   Having contractions or other symptoms of labor and < 37 weeks pregnant (i.e., )   Patient wants to be seen    Protocols used: ST PREGNANCY - ABDOMINAL PAIN GREATER THAN 20 WEEKS EGA-A-OH, ST PREGNANCY - LABOR - [id number]

## 2019-01-16 NOTE — TELEPHONE ENCOUNTER
1/16/19 @ 1706 ([facility])  CALL ATTEMPT TO PT UNSUCCESSFUL , MESSAGE LEFT FOR PT TO RETURN CALL TO OFFICE CONCERNING MAKING AN APPT ASAP WITH DR CORONADO, INSTRUCTED PT TO CALL IN AM , INFORMED PT IF SHE EXPERIENCES CONTRACTIONS THAT ARE STEADY EVERY 6-5 MIN APART AND INCREASING IN INTENSITY TO GO DIRECTLY TO E.R. , LEFT MESSAGE FOR HER TO REST DO NOT DO A LOT OF WALKING OR  ANYTHING OVER 5LBS  AND TO STAY HYDRATED

## 2019-01-17 ENCOUNTER — ROUTINE PRENATAL (OUTPATIENT)
Dept: OBSTETRICS AND GYNECOLOGY | Facility: CLINIC | Age: 29
End: 2019-01-17
Payer: MEDICAID

## 2019-01-17 VITALS
BODY MASS INDEX: 23.11 KG/M2 | DIASTOLIC BLOOD PRESSURE: 68 MMHG | SYSTOLIC BLOOD PRESSURE: 100 MMHG | WEIGHT: 138.88 LBS

## 2019-01-17 DIAGNOSIS — Z3A.22 22 WEEKS GESTATION OF PREGNANCY: Primary | ICD-10-CM

## 2019-01-17 PROCEDURE — 99999 PR PBB SHADOW E&M-EST. PATIENT-LVL II: CPT | Mod: PBBFAC,,, | Performed by: OBSTETRICS & GYNECOLOGY

## 2019-01-17 PROCEDURE — 99213 OFFICE O/P EST LOW 20 MIN: CPT | Mod: TH,S$PBB,, | Performed by: OBSTETRICS & GYNECOLOGY

## 2019-01-17 PROCEDURE — 99999 PR PBB SHADOW E&M-EST. PATIENT-LVL II: ICD-10-PCS | Mod: PBBFAC,,, | Performed by: OBSTETRICS & GYNECOLOGY

## 2019-01-17 PROCEDURE — 99213 PR OFFICE/OUTPT VISIT, EST, LEVL III, 20-29 MIN: ICD-10-PCS | Mod: TH,S$PBB,, | Performed by: OBSTETRICS & GYNECOLOGY

## 2019-01-17 PROCEDURE — 99212 OFFICE O/P EST SF 10 MIN: CPT | Mod: PBBFAC | Performed by: OBSTETRICS & GYNECOLOGY

## 2019-01-17 NOTE — PROGRESS NOTES
22w3d here for OB visit.  Pt c/o of cramping yesterday at work.  Cramping since resolved with rest and hydration.  Pt has no major complaints today.  Reports active fetus.  Denies vaginal bleeding, leakage of fluid, or contractions.  Abd soft/NT.  Cervix closed.  Pt reassured.  Labor/preE precautions.  Return 2 wks.  Voiced understanding.

## 2019-01-30 ENCOUNTER — PATIENT MESSAGE (OUTPATIENT)
Dept: OBSTETRICS AND GYNECOLOGY | Facility: CLINIC | Age: 29
End: 2019-01-30

## 2019-02-04 ENCOUNTER — ROUTINE PRENATAL (OUTPATIENT)
Dept: OBSTETRICS AND GYNECOLOGY | Facility: CLINIC | Age: 29
End: 2019-02-04
Payer: MEDICAID

## 2019-02-04 VITALS
WEIGHT: 143.31 LBS | DIASTOLIC BLOOD PRESSURE: 64 MMHG | SYSTOLIC BLOOD PRESSURE: 100 MMHG | BODY MASS INDEX: 23.85 KG/M2

## 2019-02-04 DIAGNOSIS — Z3A.25 25 WEEKS GESTATION OF PREGNANCY: Primary | ICD-10-CM

## 2019-02-04 PROCEDURE — 99999 PR PBB SHADOW E&M-EST. PATIENT-LVL III: ICD-10-PCS | Mod: PBBFAC,,, | Performed by: OBSTETRICS & GYNECOLOGY

## 2019-02-04 PROCEDURE — 99213 OFFICE O/P EST LOW 20 MIN: CPT | Mod: PBBFAC | Performed by: OBSTETRICS & GYNECOLOGY

## 2019-02-04 PROCEDURE — 99213 PR OFFICE/OUTPT VISIT, EST, LEVL III, 20-29 MIN: ICD-10-PCS | Mod: TH,S$PBB,, | Performed by: OBSTETRICS & GYNECOLOGY

## 2019-02-04 PROCEDURE — 99999 PR PBB SHADOW E&M-EST. PATIENT-LVL III: CPT | Mod: PBBFAC,,, | Performed by: OBSTETRICS & GYNECOLOGY

## 2019-02-04 PROCEDURE — 99213 OFFICE O/P EST LOW 20 MIN: CPT | Mod: TH,S$PBB,, | Performed by: OBSTETRICS & GYNECOLOGY

## 2019-02-04 NOTE — PROGRESS NOTES
25w0d here for OB visit.  Pt has no major complaints today.  Reports active fetus.  Denies vaginal bleeding, leakage of fluid, or contractions.  Order 1 hr glucola, CBC.  Labor/preE precautions.  Kick counts.  Return 2 wks.  Voiced understanding.  Significant other present for visit.

## 2019-02-05 ENCOUNTER — LAB VISIT (OUTPATIENT)
Dept: LAB | Facility: HOSPITAL | Age: 29
End: 2019-02-05
Attending: OBSTETRICS & GYNECOLOGY
Payer: MEDICAID

## 2019-02-05 DIAGNOSIS — Z3A.25 25 WEEKS GESTATION OF PREGNANCY: ICD-10-CM

## 2019-02-05 LAB
BASO STIPL BLD QL SMEAR: ABNORMAL
BASOPHILS # BLD AUTO: 0.01 K/UL
BASOPHILS NFR BLD: 0.2 %
DIFFERENTIAL METHOD: ABNORMAL
EOSINOPHIL # BLD AUTO: 0.1 K/UL
EOSINOPHIL NFR BLD: 0.8 %
ERYTHROCYTE [DISTWIDTH] IN BLOOD BY AUTOMATED COUNT: 16.1 %
GIANT PLATELETS BLD QL SMEAR: PRESENT
GLUCOSE SERPL-MCNC: 67 MG/DL
HCT VFR BLD AUTO: 30.3 %
HGB BLD-MCNC: 9.5 G/DL
LYMPHOCYTES # BLD AUTO: 1.4 K/UL
LYMPHOCYTES NFR BLD: 21.8 %
MCH RBC QN AUTO: 20 PG
MCHC RBC AUTO-ENTMCNC: 31.4 G/DL
MCV RBC AUTO: 64 FL
MONOCYTES # BLD AUTO: 0.6 K/UL
MONOCYTES NFR BLD: 9 %
NEUTROPHILS # BLD AUTO: 4.3 K/UL
NEUTROPHILS NFR BLD: 69.1 %
PLATELET # BLD AUTO: 192 K/UL
PLATELET BLD QL SMEAR: ABNORMAL
PMV BLD AUTO: ABNORMAL FL
POLYCHROMASIA BLD QL SMEAR: ABNORMAL
RBC # BLD AUTO: 4.74 M/UL
WBC # BLD AUTO: 6.34 K/UL

## 2019-02-05 PROCEDURE — 82950 GLUCOSE TEST: CPT

## 2019-02-05 PROCEDURE — 36415 COLL VENOUS BLD VENIPUNCTURE: CPT

## 2019-02-05 PROCEDURE — 85025 COMPLETE CBC W/AUTO DIFF WBC: CPT

## 2019-02-18 ENCOUNTER — ROUTINE PRENATAL (OUTPATIENT)
Dept: OBSTETRICS AND GYNECOLOGY | Facility: CLINIC | Age: 29
End: 2019-02-18
Payer: MEDICAID

## 2019-02-18 VITALS
SYSTOLIC BLOOD PRESSURE: 100 MMHG | WEIGHT: 146.19 LBS | BODY MASS INDEX: 24.32 KG/M2 | DIASTOLIC BLOOD PRESSURE: 68 MMHG

## 2019-02-18 DIAGNOSIS — O99.013 ANEMIA DURING PREGNANCY IN THIRD TRIMESTER: ICD-10-CM

## 2019-02-18 DIAGNOSIS — Z3A.27 27 WEEKS GESTATION OF PREGNANCY: Primary | ICD-10-CM

## 2019-02-18 PROCEDURE — 99213 OFFICE O/P EST LOW 20 MIN: CPT | Mod: TH,S$PBB,, | Performed by: OBSTETRICS & GYNECOLOGY

## 2019-02-18 PROCEDURE — 99213 OFFICE O/P EST LOW 20 MIN: CPT | Mod: PBBFAC,TH | Performed by: OBSTETRICS & GYNECOLOGY

## 2019-02-18 PROCEDURE — 99999 PR PBB SHADOW E&M-EST. PATIENT-LVL III: ICD-10-PCS | Mod: PBBFAC,,, | Performed by: OBSTETRICS & GYNECOLOGY

## 2019-02-18 PROCEDURE — 99999 PR PBB SHADOW E&M-EST. PATIENT-LVL III: CPT | Mod: PBBFAC,,, | Performed by: OBSTETRICS & GYNECOLOGY

## 2019-02-18 PROCEDURE — 99213 PR OFFICE/OUTPT VISIT, EST, LEVL III, 20-29 MIN: ICD-10-PCS | Mod: TH,S$PBB,, | Performed by: OBSTETRICS & GYNECOLOGY

## 2019-02-18 RX ORDER — FLUTICASONE PROPIONATE 50 MCG
SPRAY, SUSPENSION (ML) NASAL
Refills: 2 | COMMUNITY
Start: 2019-01-30

## 2019-02-18 RX ORDER — FERROUS SULFATE 325(65) MG
325 TABLET ORAL 2 TIMES DAILY
Qty: 60 TABLET | Refills: 1 | Status: SHIPPED | OUTPATIENT
Start: 2019-02-18

## 2019-02-18 RX ORDER — DOCUSATE SODIUM 100 MG/1
100 CAPSULE, LIQUID FILLED ORAL 2 TIMES DAILY PRN
Qty: 60 CAPSULE | Refills: 1 | Status: ON HOLD | OUTPATIENT
Start: 2019-02-18 | End: 2019-05-18 | Stop reason: HOSPADM

## 2019-02-18 NOTE — PROGRESS NOTES
27w0d here for OB visit.  Pt has no major complaints today.  Reports active fetus.  Denies vaginal bleeding, leakage of fluid, or contractions.  1 hr glucola 67.  Mild anemia, start fergon/colace.  Labor/preE precautions.  Kick counts.  Return 2 wks.  Voiced understanding.

## 2019-03-04 ENCOUNTER — ROUTINE PRENATAL (OUTPATIENT)
Dept: OBSTETRICS AND GYNECOLOGY | Facility: CLINIC | Age: 29
End: 2019-03-04
Payer: MEDICAID

## 2019-03-04 VITALS
WEIGHT: 151.38 LBS | DIASTOLIC BLOOD PRESSURE: 71 MMHG | SYSTOLIC BLOOD PRESSURE: 114 MMHG | BODY MASS INDEX: 25.19 KG/M2

## 2019-03-04 DIAGNOSIS — Z3A.29 29 WEEKS GESTATION OF PREGNANCY: Primary | ICD-10-CM

## 2019-03-04 PROCEDURE — 99213 OFFICE O/P EST LOW 20 MIN: CPT | Mod: TH,S$PBB,, | Performed by: OBSTETRICS & GYNECOLOGY

## 2019-03-04 PROCEDURE — 99213 OFFICE O/P EST LOW 20 MIN: CPT | Mod: PBBFAC | Performed by: OBSTETRICS & GYNECOLOGY

## 2019-03-04 PROCEDURE — 99213 PR OFFICE/OUTPT VISIT, EST, LEVL III, 20-29 MIN: ICD-10-PCS | Mod: TH,S$PBB,, | Performed by: OBSTETRICS & GYNECOLOGY

## 2019-03-04 PROCEDURE — 99999 PR PBB SHADOW E&M-EST. PATIENT-LVL III: CPT | Mod: PBBFAC,,, | Performed by: OBSTETRICS & GYNECOLOGY

## 2019-03-04 PROCEDURE — 99999 PR PBB SHADOW E&M-EST. PATIENT-LVL III: ICD-10-PCS | Mod: PBBFAC,,, | Performed by: OBSTETRICS & GYNECOLOGY

## 2019-03-04 NOTE — PROGRESS NOTES
29w0d here for OB visit.    Pt has no major complaints today.  Reports active fetus.  Denies vaginal bleeding, leakage of fluid, or contractions.  Continue fergon/colace for anemia.  Labor/preE precautions.  Kick counts.  Return 2 wks.  Voiced understanding.  Significant other present for visit.    Octavio Donaldson MD

## 2019-03-18 ENCOUNTER — ROUTINE PRENATAL (OUTPATIENT)
Dept: OBSTETRICS AND GYNECOLOGY | Facility: CLINIC | Age: 29
End: 2019-03-18
Payer: MEDICAID

## 2019-03-18 VITALS — BODY MASS INDEX: 25.7 KG/M2 | DIASTOLIC BLOOD PRESSURE: 64 MMHG | SYSTOLIC BLOOD PRESSURE: 104 MMHG | WEIGHT: 154.44 LBS

## 2019-03-18 DIAGNOSIS — Z3A.31 31 WEEKS GESTATION OF PREGNANCY: Primary | ICD-10-CM

## 2019-03-18 PROCEDURE — 99213 OFFICE O/P EST LOW 20 MIN: CPT | Mod: TH,S$PBB,, | Performed by: OBSTETRICS & GYNECOLOGY

## 2019-03-18 PROCEDURE — 99213 OFFICE O/P EST LOW 20 MIN: CPT | Mod: PBBFAC | Performed by: OBSTETRICS & GYNECOLOGY

## 2019-03-18 PROCEDURE — 99999 PR PBB SHADOW E&M-EST. PATIENT-LVL III: ICD-10-PCS | Mod: PBBFAC,,, | Performed by: OBSTETRICS & GYNECOLOGY

## 2019-03-18 PROCEDURE — 99213 PR OFFICE/OUTPT VISIT, EST, LEVL III, 20-29 MIN: ICD-10-PCS | Mod: TH,S$PBB,, | Performed by: OBSTETRICS & GYNECOLOGY

## 2019-03-18 PROCEDURE — 99999 PR PBB SHADOW E&M-EST. PATIENT-LVL III: CPT | Mod: PBBFAC,,, | Performed by: OBSTETRICS & GYNECOLOGY

## 2019-03-18 NOTE — PROGRESS NOTES
31w0d here for OB visit.    Pt has no major complaints today.  Reports active fetus.  Denies vaginal bleeding, leakage of fluid, or contractions.  Continue fergon/colace for anemia.  Labor/preE precautions.  Kick counts.  Return 2 wks.  Voiced understanding.

## 2019-04-02 ENCOUNTER — ROUTINE PRENATAL (OUTPATIENT)
Dept: OBSTETRICS AND GYNECOLOGY | Facility: CLINIC | Age: 29
End: 2019-04-02
Payer: MEDICAID

## 2019-04-02 VITALS
WEIGHT: 157.44 LBS | SYSTOLIC BLOOD PRESSURE: 102 MMHG | DIASTOLIC BLOOD PRESSURE: 70 MMHG | BODY MASS INDEX: 26.19 KG/M2

## 2019-04-02 DIAGNOSIS — Z36.89 ENCOUNTER FOR ULTRASOUND TO CHECK FETAL GROWTH: ICD-10-CM

## 2019-04-02 DIAGNOSIS — Z3A.33 33 WEEKS GESTATION OF PREGNANCY: Primary | ICD-10-CM

## 2019-04-02 DIAGNOSIS — D56.3 BETA THALASSEMIA MINOR: ICD-10-CM

## 2019-04-02 PROBLEM — Z34.93 PREGNANCY WITH ONE FETUS IN THIRD TRIMESTER: Status: ACTIVE | Noted: 2019-04-02

## 2019-04-02 PROCEDURE — 76815 OB US LIMITED FETUS(S): CPT | Mod: 26,S$PBB,, | Performed by: OBSTETRICS & GYNECOLOGY

## 2019-04-02 PROCEDURE — 99999 PR PBB SHADOW E&M-EST. PATIENT-LVL III: ICD-10-PCS | Mod: PBBFAC,,, | Performed by: OBSTETRICS & GYNECOLOGY

## 2019-04-02 PROCEDURE — 76815 PR  US,PREGNANT UTERUS,LIMITED, 1/> FETUSES: ICD-10-PCS | Mod: 26,S$PBB,, | Performed by: OBSTETRICS & GYNECOLOGY

## 2019-04-02 PROCEDURE — 99213 PR OFFICE/OUTPT VISIT, EST, LEVL III, 20-29 MIN: ICD-10-PCS | Mod: TH,S$PBB,25, | Performed by: OBSTETRICS & GYNECOLOGY

## 2019-04-02 PROCEDURE — 76815 OB US LIMITED FETUS(S): CPT | Mod: PBBFAC | Performed by: OBSTETRICS & GYNECOLOGY

## 2019-04-02 PROCEDURE — 99999 PR PBB SHADOW E&M-EST. PATIENT-LVL III: CPT | Mod: PBBFAC,,, | Performed by: OBSTETRICS & GYNECOLOGY

## 2019-04-02 PROCEDURE — 99213 OFFICE O/P EST LOW 20 MIN: CPT | Mod: PBBFAC,TH,25 | Performed by: OBSTETRICS & GYNECOLOGY

## 2019-04-02 PROCEDURE — 99213 OFFICE O/P EST LOW 20 MIN: CPT | Mod: TH,S$PBB,25, | Performed by: OBSTETRICS & GYNECOLOGY

## 2019-04-02 NOTE — PROGRESS NOTES
33w1d here for OB visit.    Pt has no major complaints today.  Reports active fetus.  Denies vaginal bleeding, leakage of fluid, or contractions.    Ultrasound done for fetal growth done today secondary to beta thal minor.  US showed adequate interval fetal growth.  Limitation of today's u/s exam discussed.  FOB beta thal status unknown, declined paternal testing.    Continue fergon/colace for anemia.  Labor/preE precautions.  Kick counts.  Return 2 wks.  Voiced understanding.    Octavio Donaldson MD

## 2019-04-08 ENCOUNTER — NURSE TRIAGE (OUTPATIENT)
Dept: ADMINISTRATIVE | Facility: CLINIC | Age: 29
End: 2019-04-08

## 2019-04-08 ENCOUNTER — HOSPITAL ENCOUNTER (OUTPATIENT)
Facility: HOSPITAL | Age: 29
Discharge: HOME | End: 2019-04-08
Attending: OBSTETRICS & GYNECOLOGY | Admitting: OBSTETRICS & GYNECOLOGY
Payer: MEDICAID

## 2019-04-08 VITALS
RESPIRATION RATE: 18 BRPM | SYSTOLIC BLOOD PRESSURE: 109 MMHG | TEMPERATURE: 99 F | HEIGHT: 65 IN | BODY MASS INDEX: 25.99 KG/M2 | DIASTOLIC BLOOD PRESSURE: 69 MMHG | WEIGHT: 156 LBS

## 2019-04-08 DIAGNOSIS — Z3A.34 34 WEEKS GESTATION OF PREGNANCY: Primary | ICD-10-CM

## 2019-04-08 DIAGNOSIS — O47.00 PRETERM CONTRACTIONS: ICD-10-CM

## 2019-04-08 PROCEDURE — 99213 PR OFFICE/OUTPT VISIT, EST, LEVL III, 20-29 MIN: ICD-10-PCS | Mod: 25,TH,, | Performed by: OBSTETRICS & GYNECOLOGY

## 2019-04-08 PROCEDURE — 99213 OFFICE O/P EST LOW 20 MIN: CPT | Mod: 25,TH,, | Performed by: OBSTETRICS & GYNECOLOGY

## 2019-04-08 PROCEDURE — 99211 OFF/OP EST MAY X REQ PHY/QHP: CPT

## 2019-04-08 PROCEDURE — 59025 FETAL NON-STRESS TEST: CPT | Mod: 26,,, | Performed by: OBSTETRICS & GYNECOLOGY

## 2019-04-08 PROCEDURE — 59025 PR FETAL 2N-STRESS TEST: ICD-10-PCS | Mod: 26,,, | Performed by: OBSTETRICS & GYNECOLOGY

## 2019-04-08 NOTE — DISCHARGE INSTRUCTIONS
Home Undelivered Discharge Instructions    After Discharge Orders:    Future Appointments   Date Time Provider Department Center   4/15/2019 10:20 AM Octavio Donaldson MD [facility] Susan Fajardo       Call physician or midwife's office  for instructions.    Current Discharge Medication List      CONTINUE these medications which have NOT CHANGED    Details   docusate sodium (COLACE) 100 MG capsule Take 1 capsule (100 mg total) by mouth 2 (two) times daily as needed for Constipation.  Qty: 60 capsule, Refills: 1    Associated Diagnoses: 27 weeks gestation of pregnancy      ferrous sulfate (FEOSOL) 325 mg (65 mg iron) Tab tablet Take 1 tablet (325 mg total) by mouth 2 (two) times daily.  Qty: 60 tablet, Refills: 1    Associated Diagnoses: 27 weeks gestation of pregnancy      fluticasone (FLONASE) 50 mcg/actuation nasal spray SPRAY 1 TO 2 SPRAYS IN EACH NOSTRIL QD PRN NASAL CONGESTION  Refills: 2      ondansetron (ZOFRAN) 4 MG tablet Take 1 tablet (4 mg total) by mouth daily as needed for Nausea.  Qty: 30 tablet, Refills: 1    Associated Diagnoses: Nausea and vomiting during pregnancy      ondansetron (ZOFRAN-ODT) 4 MG TbDL Take 1 tablet (4 mg total) by mouth every 6 (six) hours as needed (nausea).  Qty: 30 tablet, Refills: 1    Associated Diagnoses: Nausea and vomiting during pregnancy      prenatal vit/iron fum/folic ac (PRENATAL 1+1 ORAL) Take by mouth.      promethazine (PHENERGAN) 12.5 MG Tab Take 1 tablet (12.5 mg total) by mouth every 6 (six) hours as needed (nausea).  Qty: 20 tablet, Refills: 0    Associated Diagnoses: Nausea and vomiting during pregnancy                     · Diet:  normal diet as tolerated    · Rest: normal activity as tolerated    Other instructions: Do kick counts once a day on your baby. Choose the time of day your baby is most active. Get in a comfortable lying or sitting position and time how long it takes to feel 10 kicks, twists, turns, swishes, or rolls. Call your physician or midwife if  there have not been 10 kicks in 2 hours    Call physician or midwife, return to Labor and Delivery, call 911, or go to the nearest Emergency Room if: increased leakage or fluid, decreased fetal movement, persistent low back pain or cramping, bleeding from vaginal area, difficulty urinating, pain with urination, difficulty breathing, new calf pain, persistent headache or vision change

## 2019-04-08 NOTE — TELEPHONE ENCOUNTER
Reason for Disposition   MILD constant abdominal pain (e.g., doesn't interfere with normal activities) and present > 2 hours    Protocols used: PREGNANCY - ABDOMINAL PAIN GREATER THAN 20 WEEKS EGA-A-OH    Patient c/o abdominal pain 1/10, denies any vaginal bleeding, vaginal discharge, and back pain at this time. Abdominal pain since 3 pm yesterday along with hand swelling. Patient advised to go to L&D for further evaluation.

## 2019-04-08 NOTE — NURSING
Admit reporting contractions since yesterday.Mild ctx palpated during assessment.reports pressure with ctx.Abd soft and non-tender to palpate with active fetus.Denies any change in vaginal discharge.No blood or fluid noted during exam.Cvx closed thick and firm to palpated.PO hydration initiated.Plan of care reviewed. in attendance.

## 2019-04-08 NOTE — PROGRESS NOTES
"[facility]  Progress Note  Obstetrics & Gynecology    Date:  2019    Chief complaint: Contractions     Subjective:  April Nadeen is a 28 y.o.  at 34w0d who presents to L&D c/o irregular mild contractions after intercourse.  Pt reports active fetus and denies any vaginal bleeding, leakage of fluid.  Pt antepartum course has been fairly uneventful.  Significant other present at bedside.    Review of Systems:      Constitutional:  No fever, fatigue  HENT:  No headaches or visual disturbance  Respiratory:  No cough, shortness of breath  Cardiovascular:  No chest pain, leg swelling  Gastrointestinal:  No nausea, vomiting, constipation, blood in stool   Genitourinary:  No dysuria, frequency  Musculoskeletal:  No back pain, arthralgias  Skin:  No rash, jaundice  Neurological:  No dizziness, weakness, headaches  Psychiatric/Behavioral:  No sleep disturbance, dysphoric mood    Objective:    Temp:  [98.7 °F (37.1 °C)] 98.7 °F (37.1 °C)  Resp:  [18] 18  BP: (109)/(69) 109/69    /69   Temp 98.7 °F (37.1 °C)   Resp 18   Ht 5' 5" (1.651 m)   Wt 70.8 kg (156 lb)   LMP 2018 (Exact Date)   Breastfeeding? No   BMI 25.96 kg/m²      Physical Exam:   Constitutional: She appears well-developed. No distress.                             HEENT:  NCAT, anicteric, moist mucus membranes.    LUNGS:  Clear to auscultation bilaterally.  HEART:  Regular rate & rhythm with physiologic heart sounds.  ABDOMEN:  Soft, non tender without any guarding, rigidity or rebound. Normoactive bowel sounds.  Size = dates.  PELVIC:  Normal female external genitalia without gross lesions, rashes or excoriations.  Cervix: Cervix closed, thick, high, posterior, unchanged x ~3 hrs prior to discharged.  EXTREMITIES:  Symmetric without cramping, claudication or edema LE. +2 distal pulses.  FROM.  SKIN:  No rashes, good turgor & capillary refill.  NEUROLOGIC:  Grossly intact bilaterally. +2 DTR, symmetric.  PSYCH:  " Mood & affect appropriate.       NST: Cat 1  [place]: irregular ctx    Hospital Course:      Pt was observed on L&D, received supportive care, and had no acute events.  Labor was ruled out.  Her cramping improved prior to discharge.  Maternal and fetal status was overall reassuring.  Pt ate a meal prior to discharge.  Pt was observed talking on the phone and watching TV during her time is triage and appeared to be in no acute distress and requested to go home.  Pt was discharged in stable condition.     Assessment:    1. 28 y.o.  at 34w0d  2. False labor    Plan:    Discharge home.     Labor/preE precautions and kick counts instructions.    F/u in clinic as previously scheduled, or sooner as needed.     Return to L&D if symptoms worsen, vaginal bleeding, leakage of fluid, contractions, decreased fetal movement, or any concerns.       All of her questions were answered to her satisfaction.  Pt voiced understanding and acceptance of hospital discharge.       Octavio Donaldson MD

## 2019-04-08 NOTE — NURSING
Rounds made per .Discharge order noted Written discharge instructions provided and reviewed.Discharged home in stable cond.

## 2019-04-15 ENCOUNTER — ROUTINE PRENATAL (OUTPATIENT)
Dept: OBSTETRICS AND GYNECOLOGY | Facility: CLINIC | Age: 29
End: 2019-04-15
Payer: MEDICAID

## 2019-04-15 VITALS — BODY MASS INDEX: 27.04 KG/M2 | WEIGHT: 162.5 LBS | DIASTOLIC BLOOD PRESSURE: 74 MMHG | SYSTOLIC BLOOD PRESSURE: 108 MMHG

## 2019-04-15 DIAGNOSIS — Z3A.35 35 WEEKS GESTATION OF PREGNANCY: Primary | ICD-10-CM

## 2019-04-15 PROCEDURE — 99999 PR PBB SHADOW E&M-EST. PATIENT-LVL III: ICD-10-PCS | Mod: PBBFAC,,, | Performed by: OBSTETRICS & GYNECOLOGY

## 2019-04-15 PROCEDURE — 99213 OFFICE O/P EST LOW 20 MIN: CPT | Mod: TH,S$PBB,, | Performed by: OBSTETRICS & GYNECOLOGY

## 2019-04-15 PROCEDURE — 99213 OFFICE O/P EST LOW 20 MIN: CPT | Mod: PBBFAC | Performed by: OBSTETRICS & GYNECOLOGY

## 2019-04-15 PROCEDURE — 99213 PR OFFICE/OUTPT VISIT, EST, LEVL III, 20-29 MIN: ICD-10-PCS | Mod: TH,S$PBB,, | Performed by: OBSTETRICS & GYNECOLOGY

## 2019-04-15 PROCEDURE — 99999 PR PBB SHADOW E&M-EST. PATIENT-LVL III: CPT | Mod: PBBFAC,,, | Performed by: OBSTETRICS & GYNECOLOGY

## 2019-04-15 NOTE — PROGRESS NOTES
35w0d here for OB visit.  Pt has no major complaints today.  Reports active fetus.  Denies vaginal bleeding, leakage of fluid, or contractions.  Continue fergon/colace for anemia.  Labor/preE precautions.  Kick counts.  Return 1 wk.  Voiced understanding.    Octavio Donaldson MD

## 2019-04-22 ENCOUNTER — ROUTINE PRENATAL (OUTPATIENT)
Dept: OBSTETRICS AND GYNECOLOGY | Facility: CLINIC | Age: 29
End: 2019-04-22
Payer: MEDICAID

## 2019-04-22 VITALS
SYSTOLIC BLOOD PRESSURE: 102 MMHG | WEIGHT: 163.25 LBS | DIASTOLIC BLOOD PRESSURE: 68 MMHG | BODY MASS INDEX: 27.17 KG/M2

## 2019-04-22 DIAGNOSIS — Z3A.36 36 WEEKS GESTATION OF PREGNANCY: Primary | ICD-10-CM

## 2019-04-22 PROCEDURE — 99213 OFFICE O/P EST LOW 20 MIN: CPT | Mod: PBBFAC | Performed by: OBSTETRICS & GYNECOLOGY

## 2019-04-22 PROCEDURE — 87081 CULTURE SCREEN ONLY: CPT

## 2019-04-22 PROCEDURE — 99999 PR PBB SHADOW E&M-EST. PATIENT-LVL III: CPT | Mod: PBBFAC,,, | Performed by: OBSTETRICS & GYNECOLOGY

## 2019-04-22 PROCEDURE — 99213 OFFICE O/P EST LOW 20 MIN: CPT | Mod: TH,S$PBB,, | Performed by: OBSTETRICS & GYNECOLOGY

## 2019-04-22 PROCEDURE — 99999 PR PBB SHADOW E&M-EST. PATIENT-LVL III: ICD-10-PCS | Mod: PBBFAC,,, | Performed by: OBSTETRICS & GYNECOLOGY

## 2019-04-22 PROCEDURE — 99213 PR OFFICE/OUTPT VISIT, EST, LEVL III, 20-29 MIN: ICD-10-PCS | Mod: TH,S$PBB,, | Performed by: OBSTETRICS & GYNECOLOGY

## 2019-04-22 NOTE — PROGRESS NOTES
36w0d here for OB visit.  Pt has no major complaints today.  Reports active fetus.  Denies vaginal bleeding, leakage of fluid, or contractions.  Order GBS.  Delivery consents signed.  Continue fergon/colace for anemia.  Labor/preE precautions.  Kick counts.  Return 1 wk.  Significant other present for visit.  Voiced understanding.    Octavio Donaldson MD

## 2019-04-24 LAB — BACTERIA SPEC AEROBE CULT: NORMAL

## 2019-04-29 ENCOUNTER — ROUTINE PRENATAL (OUTPATIENT)
Dept: OBSTETRICS AND GYNECOLOGY | Facility: CLINIC | Age: 29
End: 2019-04-29
Payer: MEDICAID

## 2019-04-29 VITALS
DIASTOLIC BLOOD PRESSURE: 66 MMHG | WEIGHT: 166.69 LBS | SYSTOLIC BLOOD PRESSURE: 104 MMHG | BODY MASS INDEX: 27.73 KG/M2

## 2019-04-29 DIAGNOSIS — Z3A.37 37 WEEKS GESTATION OF PREGNANCY: Primary | ICD-10-CM

## 2019-04-29 PROCEDURE — 99213 OFFICE O/P EST LOW 20 MIN: CPT | Mod: PBBFAC | Performed by: OBSTETRICS & GYNECOLOGY

## 2019-04-29 PROCEDURE — 99213 PR OFFICE/OUTPT VISIT, EST, LEVL III, 20-29 MIN: ICD-10-PCS | Mod: TH,S$PBB,, | Performed by: OBSTETRICS & GYNECOLOGY

## 2019-04-29 PROCEDURE — 99999 PR PBB SHADOW E&M-EST. PATIENT-LVL III: ICD-10-PCS | Mod: PBBFAC,,, | Performed by: OBSTETRICS & GYNECOLOGY

## 2019-04-29 PROCEDURE — 99999 PR PBB SHADOW E&M-EST. PATIENT-LVL III: CPT | Mod: PBBFAC,,, | Performed by: OBSTETRICS & GYNECOLOGY

## 2019-04-29 PROCEDURE — 99213 OFFICE O/P EST LOW 20 MIN: CPT | Mod: TH,S$PBB,, | Performed by: OBSTETRICS & GYNECOLOGY

## 2019-04-29 NOTE — PROGRESS NOTES
37w0d here for OB visit.    No major complaints today.  Reports active fetus.  Denies vaginal bleeding, leakage of fluid, or contractions.  GBS negative.  Continue fergon/colace for anemia.  Labor/preE precautions.  Kick counts.  Return 1 wk.  Significant other present for visit.  Voiced understanding.    Octavio Donaldson MD

## 2019-05-06 ENCOUNTER — ROUTINE PRENATAL (OUTPATIENT)
Dept: OBSTETRICS AND GYNECOLOGY | Facility: CLINIC | Age: 29
End: 2019-05-06
Payer: MEDICAID

## 2019-05-06 VITALS
DIASTOLIC BLOOD PRESSURE: 68 MMHG | SYSTOLIC BLOOD PRESSURE: 113 MMHG | WEIGHT: 167.31 LBS | BODY MASS INDEX: 27.84 KG/M2

## 2019-05-06 DIAGNOSIS — Z3A.38 38 WEEKS GESTATION OF PREGNANCY: Primary | ICD-10-CM

## 2019-05-06 PROCEDURE — 99213 PR OFFICE/OUTPT VISIT, EST, LEVL III, 20-29 MIN: ICD-10-PCS | Mod: TH,S$PBB,, | Performed by: OBSTETRICS & GYNECOLOGY

## 2019-05-06 PROCEDURE — 99213 OFFICE O/P EST LOW 20 MIN: CPT | Mod: PBBFAC | Performed by: OBSTETRICS & GYNECOLOGY

## 2019-05-06 PROCEDURE — 99999 PR PBB SHADOW E&M-EST. PATIENT-LVL III: ICD-10-PCS | Mod: PBBFAC,,, | Performed by: OBSTETRICS & GYNECOLOGY

## 2019-05-06 PROCEDURE — 99213 OFFICE O/P EST LOW 20 MIN: CPT | Mod: TH,S$PBB,, | Performed by: OBSTETRICS & GYNECOLOGY

## 2019-05-06 PROCEDURE — 99999 PR PBB SHADOW E&M-EST. PATIENT-LVL III: CPT | Mod: PBBFAC,,, | Performed by: OBSTETRICS & GYNECOLOGY

## 2019-05-06 NOTE — PROGRESS NOTES
38w0d here for OB visit.  Pt has no major complaints today.  Reports active fetus.  Denies vaginal bleeding, leakage of fluid, or contractions.  GBS negative.  Continue fergon/colace for anemia.  Labor/preE precautions.  Kick counts.  Return 1 wk.  Significant other present for visit.  Voiced understanding.    Octavio Donaldson MD

## 2019-05-13 ENCOUNTER — ROUTINE PRENATAL (OUTPATIENT)
Dept: OBSTETRICS AND GYNECOLOGY | Facility: CLINIC | Age: 29
End: 2019-05-13
Payer: MEDICAID

## 2019-05-13 ENCOUNTER — PATIENT MESSAGE (OUTPATIENT)
Dept: OBSTETRICS AND GYNECOLOGY | Facility: CLINIC | Age: 29
End: 2019-05-13

## 2019-05-13 VITALS
SYSTOLIC BLOOD PRESSURE: 100 MMHG | BODY MASS INDEX: 27.99 KG/M2 | WEIGHT: 168.19 LBS | DIASTOLIC BLOOD PRESSURE: 66 MMHG

## 2019-05-13 DIAGNOSIS — Z3A.39 39 WEEKS GESTATION OF PREGNANCY: Primary | ICD-10-CM

## 2019-05-13 PROCEDURE — 99999 PR PBB SHADOW E&M-EST. PATIENT-LVL III: ICD-10-PCS | Mod: PBBFAC,,, | Performed by: OBSTETRICS & GYNECOLOGY

## 2019-05-13 PROCEDURE — 99213 PR OFFICE/OUTPT VISIT, EST, LEVL III, 20-29 MIN: ICD-10-PCS | Mod: TH,S$PBB,, | Performed by: OBSTETRICS & GYNECOLOGY

## 2019-05-13 PROCEDURE — 99213 OFFICE O/P EST LOW 20 MIN: CPT | Mod: TH,S$PBB,, | Performed by: OBSTETRICS & GYNECOLOGY

## 2019-05-13 PROCEDURE — 99999 PR PBB SHADOW E&M-EST. PATIENT-LVL III: CPT | Mod: PBBFAC,,, | Performed by: OBSTETRICS & GYNECOLOGY

## 2019-05-13 PROCEDURE — 99213 OFFICE O/P EST LOW 20 MIN: CPT | Mod: PBBFAC | Performed by: OBSTETRICS & GYNECOLOGY

## 2019-05-13 RX ORDER — MISOPROSTOL 100 UG/1
600 TABLET ORAL
Status: CANCELLED | OUTPATIENT
Start: 2019-05-13

## 2019-05-13 RX ORDER — SODIUM CHLORIDE, SODIUM LACTATE, POTASSIUM CHLORIDE, CALCIUM CHLORIDE 600; 310; 30; 20 MG/100ML; MG/100ML; MG/100ML; MG/100ML
INJECTION, SOLUTION INTRAVENOUS CONTINUOUS
Status: CANCELLED | OUTPATIENT
Start: 2019-05-13

## 2019-05-13 RX ORDER — SODIUM CHLORIDE 9 MG/ML
INJECTION, SOLUTION INTRAVENOUS
Status: CANCELLED | OUTPATIENT
Start: 2019-05-13

## 2019-05-13 RX ORDER — ONDANSETRON 4 MG/1
8 TABLET, ORALLY DISINTEGRATING ORAL EVERY 8 HOURS PRN
Status: CANCELLED | OUTPATIENT
Start: 2019-05-13

## 2019-05-13 RX ORDER — BUTORPHANOL TARTRATE 1 MG/ML
1 INJECTION INTRAMUSCULAR; INTRAVENOUS
Status: CANCELLED | OUTPATIENT
Start: 2019-05-13

## 2019-05-13 NOTE — PROGRESS NOTES
39w0d here for OB visit.    No major complaints today.  Reports active fetus.  Denies vaginal bleeding, leakage of fluid, or contractions.    Pt is considering IOL of labor this week or early next week.  Benefits of waiting for spontaneous labor discussed.  Risks, benefits, and alternatives to IOL reviewed including but not limited to failed induction resulting .  Pt states she will notify office of her decision.    Continue fergon/colace for anemia.  Labor/preE precautions.  Kick counts.  Return prn pending pt decision regarding IOL.  Significant other present for visit.  Voiced understanding.    Octavio Donaldson MD

## 2019-05-15 ENCOUNTER — HOSPITAL ENCOUNTER (INPATIENT)
Facility: HOSPITAL | Age: 29
LOS: 3 days | Discharge: HOME | End: 2019-05-18
Attending: OBSTETRICS & GYNECOLOGY | Admitting: OBSTETRICS & GYNECOLOGY
Payer: MEDICAID

## 2019-05-15 DIAGNOSIS — Z34.93 PREGNANCY WITH ONE FETUS IN THIRD TRIMESTER: Primary | ICD-10-CM

## 2019-05-15 DIAGNOSIS — Z3A.39 39 WEEKS GESTATION OF PREGNANCY: ICD-10-CM

## 2019-05-15 LAB
ABO + RH BLD: NORMAL
ANISOCYTOSIS BLD QL SMEAR: ABNORMAL
BASOPHILS # BLD AUTO: 0.01 K/UL (ref 0–0.2)
BASOPHILS NFR BLD: 0.1 % (ref 0–1.9)
BLD GP AB SCN CELLS X3 SERPL QL: NORMAL
DIFFERENTIAL METHOD: ABNORMAL
EOSINOPHIL # BLD AUTO: 0 K/UL (ref 0–0.5)
EOSINOPHIL NFR BLD: 0.1 % (ref 0–8)
ERYTHROCYTE [DISTWIDTH] IN BLOOD BY AUTOMATED COUNT: 16.3 % (ref 11.5–14.5)
GIANT PLATELETS BLD QL SMEAR: PRESENT
HCT VFR BLD AUTO: 30.9 % (ref 37–48.5)
HGB BLD-MCNC: 9.8 G/DL (ref 12–16)
HIV1+2 IGG SERPL QL IA.RAPID: NEGATIVE
HYPOCHROMIA BLD QL SMEAR: ABNORMAL
LYMPHOCYTES # BLD AUTO: 1.4 K/UL (ref 1–4.8)
LYMPHOCYTES NFR BLD: 16.5 % (ref 18–48)
MCH RBC QN AUTO: 20.3 PG (ref 27–31)
MCHC RBC AUTO-ENTMCNC: 31.7 G/DL (ref 32–36)
MCV RBC AUTO: 64 FL (ref 82–98)
MONOCYTES # BLD AUTO: 0.4 K/UL (ref 0.3–1)
MONOCYTES NFR BLD: 5.4 % (ref 4–15)
NEUTROPHILS # BLD AUTO: 6.4 K/UL (ref 1.8–7.7)
NEUTROPHILS NFR BLD: 78.4 % (ref 38–73)
PLATELET # BLD AUTO: 248 K/UL (ref 150–350)
PLATELET BLD QL SMEAR: ABNORMAL
PMV BLD AUTO: ABNORMAL FL (ref 9.2–12.9)
POLYCHROMASIA BLD QL SMEAR: ABNORMAL
RBC # BLD AUTO: 4.82 M/UL (ref 4–5.4)
WBC # BLD AUTO: 8.2 K/UL (ref 3.9–12.7)

## 2019-05-15 PROCEDURE — 11000001 HC ACUTE MED/SURG PRIVATE ROOM

## 2019-05-15 PROCEDURE — 25000003 PHARM REV CODE 250: Performed by: OBSTETRICS & GYNECOLOGY

## 2019-05-15 PROCEDURE — 86592 SYPHILIS TEST NON-TREP QUAL: CPT

## 2019-05-15 PROCEDURE — 86703 HIV-1/HIV-2 1 RESULT ANTBDY: CPT

## 2019-05-15 PROCEDURE — 85025 COMPLETE CBC W/AUTO DIFF WBC: CPT

## 2019-05-15 PROCEDURE — 36415 COLL VENOUS BLD VENIPUNCTURE: CPT

## 2019-05-15 PROCEDURE — 72100002 HC LABOR CARE, 1ST 8 HOURS

## 2019-05-15 PROCEDURE — 86850 RBC ANTIBODY SCREEN: CPT

## 2019-05-15 RX ORDER — SODIUM CHLORIDE, SODIUM LACTATE, POTASSIUM CHLORIDE, CALCIUM CHLORIDE 600; 310; 30; 20 MG/100ML; MG/100ML; MG/100ML; MG/100ML
INJECTION, SOLUTION INTRAVENOUS CONTINUOUS
Status: DISCONTINUED | OUTPATIENT
Start: 2019-05-16 | End: 2019-05-17

## 2019-05-15 RX ORDER — OXYTOCIN/RINGER'S LACTATE 20/1000 ML
2 PLASTIC BAG, INJECTION (ML) INTRAVENOUS CONTINUOUS
Status: DISCONTINUED | OUTPATIENT
Start: 2019-05-16 | End: 2019-05-17

## 2019-05-15 RX ORDER — MISOPROSTOL 200 UG/1
600 TABLET ORAL
Status: DISCONTINUED | OUTPATIENT
Start: 2019-05-15 | End: 2019-05-17

## 2019-05-15 RX ORDER — OXYTOCIN/RINGER'S LACTATE 20/1000 ML
333 PLASTIC BAG, INJECTION (ML) INTRAVENOUS CONTINUOUS
Status: ACTIVE | OUTPATIENT
Start: 2019-05-15 | End: 2019-05-15

## 2019-05-15 RX ORDER — OXYTOCIN/RINGER'S LACTATE 20/1000 ML
41.7 PLASTIC BAG, INJECTION (ML) INTRAVENOUS CONTINUOUS
Status: ACTIVE | OUTPATIENT
Start: 2019-05-16 | End: 2019-05-16

## 2019-05-15 RX ORDER — SODIUM CHLORIDE, SODIUM LACTATE, POTASSIUM CHLORIDE, CALCIUM CHLORIDE 600; 310; 30; 20 MG/100ML; MG/100ML; MG/100ML; MG/100ML
INJECTION, SOLUTION INTRAVENOUS CONTINUOUS
Status: DISCONTINUED | OUTPATIENT
Start: 2019-05-15 | End: 2019-05-15

## 2019-05-15 RX ORDER — OXYTOCIN/RINGER'S LACTATE 20/1000 ML
333 PLASTIC BAG, INJECTION (ML) INTRAVENOUS CONTINUOUS
Status: ACTIVE | OUTPATIENT
Start: 2019-05-16 | End: 2019-05-16

## 2019-05-15 RX ORDER — BUTORPHANOL TARTRATE 2 MG/ML
1 INJECTION INTRAMUSCULAR; INTRAVENOUS
Status: DISCONTINUED | OUTPATIENT
Start: 2019-05-15 | End: 2019-05-17

## 2019-05-15 RX ORDER — SODIUM CHLORIDE 9 MG/ML
INJECTION, SOLUTION INTRAVENOUS
Status: DISCONTINUED | OUTPATIENT
Start: 2019-05-15 | End: 2019-05-17

## 2019-05-15 RX ORDER — OXYTOCIN/RINGER'S LACTATE 20/1000 ML
333 PLASTIC BAG, INJECTION (ML) INTRAVENOUS CONTINUOUS
Status: DISCONTINUED | OUTPATIENT
Start: 2019-05-15 | End: 2019-05-15

## 2019-05-15 RX ORDER — OXYTOCIN/RINGER'S LACTATE 20/1000 ML
41.7 PLASTIC BAG, INJECTION (ML) INTRAVENOUS CONTINUOUS
Status: ACTIVE | OUTPATIENT
Start: 2019-05-15 | End: 2019-05-15

## 2019-05-15 RX ORDER — ONDANSETRON 8 MG/1
8 TABLET, ORALLY DISINTEGRATING ORAL EVERY 8 HOURS PRN
Status: DISCONTINUED | OUTPATIENT
Start: 2019-05-15 | End: 2019-05-17

## 2019-05-15 RX ORDER — OXYTOCIN/RINGER'S LACTATE 20/1000 ML
41.7 PLASTIC BAG, INJECTION (ML) INTRAVENOUS CONTINUOUS
Status: DISCONTINUED | OUTPATIENT
Start: 2019-05-15 | End: 2019-05-15

## 2019-05-15 RX ADMIN — DINOPROSTONE 10 MG: 10 INSERT VAGINAL at 03:05

## 2019-05-15 NOTE — H&P
"[facility]    Obstetrics & Gynecology  History & Physical      Patient Name:  Mirtha Senior  MRN:  [id number]  Admission Date:  5/15/2019  Hospital Length of Stay:  0  Attending Physician:  Octavio Donaldson MD     Date:  05/15/2019    Chief Complaint:  Induction of labor    History of Present Illness:      Mirtha Senior is a 28 y.o.  at 39w2d admitted for induction of labor secondary to maternal request.  Pt has no major complaints.  She reports active fetal movements and occasional mild contractions, and denies any vaginal bleeding or leakage of fluid.  Pt antepartum course has been overall uneventful.    Past Medical History:      None      Past Surgical History:     None     Medications:      Prenatal vitamins   Fergon  Colace    Allergies:      NKDA      Obstetric History:      G1, current    Gynecologic History:      Denies recent/active STI  Denies history abnormal Pap     Social History:     Denies tobacco, alcohol or illicit drug use  Current partner is father of baby  Denies domestic abuse     Family History:      Denies congenital anomalies, inherited syndromes, fetal aneuploidy     Review of Systems   Constitutional: Negative.    HENT: Negative.    Eyes: Negative.    Respiratory: Negative.    Cardiovascular: Negative.    Gastrointestinal: Negative.    Endocrine: Negative.    Genitourinary: Negative.    Musculoskeletal: Positive for back pain.   Integumentary:  Negative.   Neurological: Negative.    Hematological: Negative.    Psychiatric/Behavioral: Negative.    Breast: negative.       Vitals:    Pulse:  [] 102  SpO2:  [92 %-100 %] 98 %  BP: ()/(62-78) 109/78    /78   Pulse 102   Ht 5' 5" (1.651 m)   Wt 74.8 kg (165 lb)   LMP 2018 (Exact Date)   SpO2 98%   Breastfeeding? No   BMI 27.46 kg/m²     Physical Exam:   Constitutional: She appears well-developed. No distress.                             Alert and oriented to person, place and time.  HEENT:  " Normocephalic, atraumatic, anicteric, EOMI, moist mucus membranes.  Neck supple without masses.  LUNGS:  Clear to auscultation bilaterally.  HEART:  Regular rate & rhythm with physiologic heart sounds.  ABDOMEN:  Soft, non tender without any guarding, rigidity or rebound. Normoactive bowel sounds.  PELVIC:  Normal female external genitalia without gross lesions, rashes or excoriations. No gross vaginal or cervical lesions.  Adequate pelvis.  Cervix: See charting.  No bleeding or LOF.  EXTREMITIES:  Symmetric without cramping, claudication. Trace edema LE. +2 distal pulses.  Full range of motion.  SKIN:  No rashes, good turgor & capillary refill.  NEUROLOGIC:  Grossly intact bilaterally. +2 DTR, symmetric.  PSYCH:  Mood & affect appropriate.       Laboratory Data:     Recent Labs   Lab 05/15/19  1449   WBC 8.20   RBC 4.82   HGB 9.8*   HCT 30.9*      MCV 64*   MCH 20.3*   MCHC 31.7*     ABO:  O POS    GBS:  negative    NST    Category: 1  Tocometry:     Presentation: cephalic   EFW ~7 lbs by Leopold's     Assessment:     1. 28 y.o.  at 39w2d for induction of labor secondary to maternal request  2. Anemia, iron deficiency    3. Beta thalassemia minor    Plan:    Admit to L&D for induction of labor    The patient was informed of the risks, benefits, alternatives and possible complications to induction of labor (with cervidil, cytotec, pitocin, and/or martell bulb), vaginal delivery, operative vaginal delivery,  section, blood transfusion, and the possibility of failed labor induction resulting in a  section due to maternal or fetal indications.  All questions were answered to her satisfaction.  She voiced understanding and acceptance of these risks.  Informed consents were obtained for delivery, labor induction and blood transfusions.    Routine admit labs    Consult anesthesia, epidural prn    Fe supplementation when ready    Notify pedi of maternal Beta thalassemia minor, pattern of  inheritance discussed      Octavio Donaldson MD

## 2019-05-16 ENCOUNTER — ANESTHESIA (OUTPATIENT)
Dept: OBSTETRICS AND GYNECOLOGY | Facility: HOSPITAL | Age: 29
End: 2019-05-16
Payer: MEDICAID

## 2019-05-16 ENCOUNTER — ANESTHESIA EVENT (OUTPATIENT)
Dept: OBSTETRICS AND GYNECOLOGY | Facility: HOSPITAL | Age: 29
End: 2019-05-16
Payer: MEDICAID

## 2019-05-16 LAB — RPR SER QL: NORMAL

## 2019-05-16 PROCEDURE — 27200710 HC EPIDURAL INFUSION PUMP SET: Performed by: ANESTHESIOLOGY

## 2019-05-16 PROCEDURE — 11000001 HC ACUTE MED/SURG PRIVATE ROOM

## 2019-05-16 PROCEDURE — 72200005 HC VAGINAL DELIVERY LEVEL II

## 2019-05-16 PROCEDURE — 59409 PRA ETRICAL CARE,VAG DELIV ONLY: ICD-10-PCS | Mod: AA,,, | Performed by: ANESTHESIOLOGY

## 2019-05-16 PROCEDURE — 25000003 PHARM REV CODE 250: Performed by: OBSTETRICS & GYNECOLOGY

## 2019-05-16 PROCEDURE — 59409 OBSTETRICAL CARE: CPT | Mod: AA,,, | Performed by: ANESTHESIOLOGY

## 2019-05-16 PROCEDURE — S0020 INJECTION, BUPIVICAINE HYDRO: HCPCS | Performed by: ANESTHESIOLOGY

## 2019-05-16 PROCEDURE — 72100003 HC LABOR CARE, EA. ADDL. 8 HRS

## 2019-05-16 PROCEDURE — 63600175 PHARM REV CODE 636 W HCPCS: Performed by: OBSTETRICS & GYNECOLOGY

## 2019-05-16 PROCEDURE — 62326 NJX INTERLAMINAR LMBR/SAC: CPT | Performed by: ANESTHESIOLOGY

## 2019-05-16 PROCEDURE — 59409 OBSTETRICAL CARE: CPT | Mod: GB,,, | Performed by: OBSTETRICS & GYNECOLOGY

## 2019-05-16 PROCEDURE — 27800517 HC TRAY,EPIDURAL-CONTINUOUS: Performed by: ANESTHESIOLOGY

## 2019-05-16 PROCEDURE — 51702 INSERT TEMP BLADDER CATH: CPT

## 2019-05-16 PROCEDURE — 25000003 PHARM REV CODE 250: Performed by: ANESTHESIOLOGY

## 2019-05-16 PROCEDURE — 59409 PR OBSTETRICAL CARE,VAG DELIV ONLY: ICD-10-PCS | Mod: GB,,, | Performed by: OBSTETRICS & GYNECOLOGY

## 2019-05-16 RX ORDER — BUPIVACAINE HYDROCHLORIDE 2.5 MG/ML
INJECTION, SOLUTION INFILTRATION; PERINEURAL
Status: COMPLETED | OUTPATIENT
Start: 2019-05-16 | End: 2019-05-16

## 2019-05-16 RX ORDER — FENTANYL/BUPIVACAINE/NS/PF 2MCG/ML-.1
PLASTIC BAG, INJECTION (ML) INJECTION CONTINUOUS PRN
Status: DISCONTINUED | OUTPATIENT
Start: 2019-05-16 | End: 2019-05-16

## 2019-05-16 RX ADMIN — PROMETHAZINE HYDROCHLORIDE 12.5 MG: 25 INJECTION INTRAMUSCULAR; INTRAVENOUS at 09:05

## 2019-05-16 RX ADMIN — SODIUM CHLORIDE, SODIUM LACTATE, POTASSIUM CHLORIDE, AND CALCIUM CHLORIDE: .6; .31; .03; .02 INJECTION, SOLUTION INTRAVENOUS at 07:05

## 2019-05-16 RX ADMIN — BUPIVACAINE HYDROCHLORIDE 8 ML: 2.5 INJECTION, SOLUTION INFILTRATION; PERINEURAL at 01:05

## 2019-05-16 RX ADMIN — PROMETHAZINE HYDROCHLORIDE 12.5 MG: 25 INJECTION INTRAMUSCULAR; INTRAVENOUS at 01:05

## 2019-05-16 RX ADMIN — Medication 2 MILLI-UNITS/MIN: at 03:05

## 2019-05-16 RX ADMIN — BUTORPHANOL TARTRATE 1 MG: 2 INJECTION, SOLUTION INTRAMUSCULAR; INTRAVENOUS at 01:05

## 2019-05-16 RX ADMIN — SODIUM CHLORIDE, SODIUM LACTATE, POTASSIUM CHLORIDE, AND CALCIUM CHLORIDE: .6; .31; .03; .02 INJECTION, SOLUTION INTRAVENOUS at 01:05

## 2019-05-16 RX ADMIN — Medication 10 ML/HR: at 02:05

## 2019-05-16 RX ADMIN — BUTORPHANOL TARTRATE 1 MG: 2 INJECTION, SOLUTION INTRAMUSCULAR; INTRAVENOUS at 09:05

## 2019-05-16 NOTE — ANESTHESIA PROCEDURE NOTES
Epidural    Patient location during procedure: OB   Reason for block: primary anesthetic   Diagnosis: IUP - Active Labor   Start time: 5/16/2019 1:36 PM  Timeout: 5/16/2019 1:23 PM  End time: 5/16/2019 1:40 PM  Staffing  Anesthesiologist: Mervat Flores MD  Performed: anesthesiologist   Preanesthetic Checklist  Completed: patient identified, site marked, surgical consent, pre-op evaluation, timeout performed, IV checked, risks and benefits discussed, monitors and equipment checked, anesthesia consent given, hand hygiene performed and patient being monitored  Preparation  Patient position: sitting  Prep: ChloraPrep  Patient monitoring: Pulse Ox and Blood Pressure  Epidural  Skin Anesthetic: lidocaine 1%  Skin Wheal: 3 mL  Administration type: single shot  Approach: midline  Interspace: L3-4    Injection technique: IONA saline  Needle and Epidural Catheter  Needle type: Tuohy   Needle gauge: 17  Needle insertion depth: 4 cm  Catheter type: springwound and multi-orifice  Catheter size: 19 G  Catheter at skin depth: 10 cm  Test dose: 5 mL of lidocaine 1.5% with Epi 1-to-200,000  Additional Documentation: incremental injection, no paresthesia on injection, negative aspiration for heme and CSF, no signs/symptoms of IV or SA injection, no significant complaints from patient and no significant pain on injection  Needle localization: anatomical landmarks  Assessment  Upper dermatomal levels - Left: T10  Right: T10   Dermatomal levels determined by alcohol wipe  Ease of block: easy  Patient's tolerance of the procedure: comfortable throughout blockNo inadvertent dural puncture with Tuohy.  Dural puncture not performed with spinal needle

## 2019-05-16 NOTE — ANESTHESIA PREPROCEDURE EVALUATION
05/16/2019    Pre-operative evaluation for * No surgery found *    April Nadeen is a 28 y.o. female     Patient Active Problem List   Diagnosis    Pregnancy with one fetus in third trimester    Beta thalassemia minor    39 weeks gestation of pregnancy       Review of patient's allergies indicates:   Allergen Reactions    Latex, natural rubber Itching and Rash       No current facility-administered medications on file prior to encounter.      Current Outpatient Medications on File Prior to Encounter   Medication Sig Dispense Refill    docusate sodium (COLACE) 100 MG capsule Take 1 capsule (100 mg total) by mouth 2 (two) times daily as needed for Constipation. 60 capsule 1    ferrous sulfate (FEOSOL) 325 mg (65 mg iron) Tab tablet Take 1 tablet (325 mg total) by mouth 2 (two) times daily. 60 tablet 1    fluticasone (FLONASE) 50 mcg/actuation nasal spray SPRAY 1 TO 2 SPRAYS IN EACH NOSTRIL QD PRN NASAL CONGESTION  2    ondansetron (ZOFRAN) 4 MG tablet Take 1 tablet (4 mg total) by mouth daily as needed for Nausea. 30 tablet 1    ondansetron (ZOFRAN-ODT) 4 MG TbDL Take 1 tablet (4 mg total) by mouth every 6 (six) hours as needed (nausea). 30 tablet 1    prenatal vit/iron fum/folic ac (PRENATAL 1+1 ORAL) Take by mouth.      promethazine (PHENERGAN) 12.5 MG Tab Take 1 tablet (12.5 mg total) by mouth every 6 (six) hours as needed (nausea). 20 tablet 0       History reviewed. No pertinent surgical history.    VITALS  Vitals:    05/16/19 0650   BP:    Pulse: 88   Resp:    Temp:            CBC:   Recent Labs     05/15/19  1449   WBC 8.20   RBC 4.82   HGB 9.8*   HCT 30.9*      MCV 64*   MCH 20.3*   MCHC 31.7*         Anesthesia Evaluation    I have reviewed the Patient Summary Reports.     I have reviewed the Medications.     Review of Systems  Anesthesia Hx:  Denies Family Hx of Anesthesia complications.   Denies Personal Hx of Anesthesia complications.   Hematology/Oncology:     Oncology Normal    Hematology Comments: Beta thalassemia minor   EENT/Dental:EENT/Dental Normal   Cardiovascular:  Cardiovascular Normal     Pulmonary:  Pulmonary Normal    Renal/:  Renal/ Normal     Hepatic/GI:  Hepatic/GI Normal    Musculoskeletal:  Musculoskeletal Normal    Neurological:  Neurology Normal    Endocrine:  Endocrine Normal    Dermatological:  Skin Normal    Psych:  Psychiatric Normal           Physical Exam  General:  Well nourished    Airway/Jaw/Neck:  Airway Findings: Mouth Opening: Normal Tongue: Normal  General Airway Assessment: Adult  Mallampati: II  Jaw/Neck Findings:  Neck ROM: Normal ROM      Dental:  Dental Findings: In tact   Chest/Lungs:  Chest/Lungs Findings: Normal Respiratory Rate         Mental Status:  Mental Status Findings:  Cooperative, Alert and Oriented         Anesthesia Plan  Type of Anesthesia, risks & benefits discussed:  Anesthesia Type:  general, spinal, CSE, epidural  Patient's Preference:   Intra-op Monitoring Plan: standard ASA monitors  Intra-op Monitoring Plan Comments:   Post Op Pain Control Plan: epidural analgesia  Post Op Pain Control Plan Comments:   Induction:   IV  Beta Blocker:  Patient is not currently on a Beta-Blocker (No further documentation required).       Informed Consent: Patient understands risks and agrees with Anesthesia plan.  Questions answered. Anesthesia consent signed with patient.  ASA Score: 2     Day of Surgery Review of History & Physical: I have interviewed and examined the patient. I have reviewed the patient's H&P dated:  There are no significant changes.          Ready For Surgery From Anesthesia Perspective.

## 2019-05-17 LAB
ANISOCYTOSIS BLD QL SMEAR: ABNORMAL
BASOPHILS # BLD AUTO: 0.01 K/UL (ref 0–0.2)
BASOPHILS NFR BLD: 0.1 % (ref 0–1.9)
DIFFERENTIAL METHOD: ABNORMAL
EOSINOPHIL # BLD AUTO: 0 K/UL (ref 0–0.5)
EOSINOPHIL NFR BLD: 0.3 % (ref 0–8)
ERYTHROCYTE [DISTWIDTH] IN BLOOD BY AUTOMATED COUNT: 16.3 % (ref 11.5–14.5)
HCT VFR BLD AUTO: 25.6 % (ref 37–48.5)
HGB BLD-MCNC: 8 G/DL (ref 12–16)
HYPOCHROMIA BLD QL SMEAR: ABNORMAL
LYMPHOCYTES # BLD AUTO: 1.3 K/UL (ref 1–4.8)
LYMPHOCYTES NFR BLD: 12.5 % (ref 18–48)
MCH RBC QN AUTO: 20 PG (ref 27–31)
MCHC RBC AUTO-ENTMCNC: 31.3 G/DL (ref 32–36)
MCV RBC AUTO: 64 FL (ref 82–98)
MONOCYTES # BLD AUTO: 1 K/UL (ref 0.3–1)
MONOCYTES NFR BLD: 9.7 % (ref 4–15)
NEUTROPHILS # BLD AUTO: 8 K/UL (ref 1.8–7.7)
NEUTROPHILS NFR BLD: 77.8 % (ref 38–73)
PLATELET # BLD AUTO: 191 K/UL (ref 150–350)
PLATELET BLD QL SMEAR: ABNORMAL
PMV BLD AUTO: ABNORMAL FL (ref 9.2–12.9)
POLYCHROMASIA BLD QL SMEAR: ABNORMAL
RBC # BLD AUTO: 4.01 M/UL (ref 4–5.4)
WBC # BLD AUTO: 10.31 K/UL (ref 3.9–12.7)

## 2019-05-17 PROCEDURE — 11000001 HC ACUTE MED/SURG PRIVATE ROOM

## 2019-05-17 PROCEDURE — 99231 SBSQ HOSP IP/OBS SF/LOW 25: CPT | Mod: ,,, | Performed by: OBSTETRICS & GYNECOLOGY

## 2019-05-17 PROCEDURE — 25000003 PHARM REV CODE 250: Performed by: OBSTETRICS & GYNECOLOGY

## 2019-05-17 PROCEDURE — 85025 COMPLETE CBC W/AUTO DIFF WBC: CPT

## 2019-05-17 PROCEDURE — 99231 PR SUBSEQUENT HOSPITAL CARE,LEVL I: ICD-10-PCS | Mod: ,,, | Performed by: OBSTETRICS & GYNECOLOGY

## 2019-05-17 PROCEDURE — 36415 COLL VENOUS BLD VENIPUNCTURE: CPT

## 2019-05-17 RX ORDER — OXYTOCIN/RINGER'S LACTATE 20/1000 ML
41.65 PLASTIC BAG, INJECTION (ML) INTRAVENOUS CONTINUOUS
Status: ACTIVE | OUTPATIENT
Start: 2019-05-17 | End: 2019-05-17

## 2019-05-17 RX ORDER — SIMETHICONE 80 MG
1 TABLET,CHEWABLE ORAL EVERY 6 HOURS PRN
Status: DISCONTINUED | OUTPATIENT
Start: 2019-05-17 | End: 2019-05-18 | Stop reason: HOSPADM

## 2019-05-17 RX ORDER — IBUPROFEN 600 MG/1
600 TABLET ORAL EVERY 6 HOURS PRN
Status: DISCONTINUED | OUTPATIENT
Start: 2019-05-17 | End: 2019-05-18 | Stop reason: HOSPADM

## 2019-05-17 RX ORDER — ONDANSETRON 8 MG/1
8 TABLET, ORALLY DISINTEGRATING ORAL EVERY 8 HOURS PRN
Status: DISCONTINUED | OUTPATIENT
Start: 2019-05-17 | End: 2019-05-18 | Stop reason: HOSPADM

## 2019-05-17 RX ORDER — DOCUSATE SODIUM 100 MG/1
200 CAPSULE, LIQUID FILLED ORAL 2 TIMES DAILY PRN
Status: DISCONTINUED | OUTPATIENT
Start: 2019-05-17 | End: 2019-05-18 | Stop reason: HOSPADM

## 2019-05-17 RX ORDER — HYDROCORTISONE 25 MG/G
CREAM TOPICAL 3 TIMES DAILY PRN
Status: DISCONTINUED | OUTPATIENT
Start: 2019-05-17 | End: 2019-05-18 | Stop reason: HOSPADM

## 2019-05-17 RX ORDER — OXYCODONE AND ACETAMINOPHEN 10; 325 MG/1; MG/1
1 TABLET ORAL EVERY 4 HOURS PRN
Status: DISCONTINUED | OUTPATIENT
Start: 2019-05-17 | End: 2019-05-18 | Stop reason: HOSPADM

## 2019-05-17 RX ORDER — DIPHENHYDRAMINE HCL 25 MG
25 CAPSULE ORAL EVERY 4 HOURS PRN
Status: DISCONTINUED | OUTPATIENT
Start: 2019-05-17 | End: 2019-05-18 | Stop reason: HOSPADM

## 2019-05-17 RX ORDER — OXYCODONE AND ACETAMINOPHEN 5; 325 MG/1; MG/1
1 TABLET ORAL EVERY 4 HOURS PRN
Status: DISCONTINUED | OUTPATIENT
Start: 2019-05-17 | End: 2019-05-18 | Stop reason: HOSPADM

## 2019-05-17 RX ADMIN — IBUPROFEN 600 MG: 600 TABLET ORAL at 02:05

## 2019-05-17 RX ADMIN — IBUPROFEN 600 MG: 600 TABLET ORAL at 08:05

## 2019-05-17 RX ADMIN — IBUPROFEN 600 MG: 600 TABLET ORAL at 07:05

## 2019-05-17 NOTE — PLAN OF CARE
Problem: Adult Inpatient Plan of Care  Goal: Patient-Specific Goal (Individualization)  Outcome: Ongoing (interventions implemented as appropriate)  Pt. tolerating pain with prn motrin she prefers. Tolerating diet.  Enc. Pt. To ambulate today in hallway. I&o today. poc reviewed with pt. And s.o. Bonding well with infant. Vss. Breastfeeding. Tucks/dermaplast use prn comfort.

## 2019-05-17 NOTE — PROGRESS NOTES
"[facility]    Obstetrics & Gynecology  Progress Note      Patient Name:  Mirtha Senior  MRN:  [id number]  Admission Date:  5/15/2019  Hospital Length of Stay:  2  Attending Physician:  Octavio Donaldson MD    Subjective:     Date:  05/17/2019    Hospital Course:  Post Partum Day # 1 - s/p vaginal delivery at 39w3d    Patient without major complaints  No acute events overnight  Denies dizziness, SOB, PA, or CP  Pain well controlled  Lochia less than menses  Bottle/breast feeding   Breast non-tender, non-engorged  Ambulating, tolerating regular diet, and voiding without diffculty  Bonding well with baby    Objective:     Temp:  [96.6 °F (35.9 °C)-99.2 °F (37.3 °C)] 96.6 °F (35.9 °C)  Pulse:  [] 91  Resp:  [17-20] 18  SpO2:  [95 %-100 %] 98 %  BP: ()/(50-90) 110/62    /62 (BP Location: Right arm, Patient Position: Lying)   Pulse 91   Temp 96.6 °F (35.9 °C) (Oral)   Resp 18   Ht 5' 5" (1.651 m)   Wt 74.8 kg (165 lb)   LMP 08/13/2018 (Exact Date)   SpO2 98%   Breastfeeding? Yes   BMI 27.46 kg/m²     Intake/Output Summary (Last 24 hours) at 5/17/2019 1745  Last data filed at 5/17/2019 1639  Gross per 24 hour   Intake 720 ml   Output 3541 ml   Net -2821 ml   Clear, mauro urine    Physical Exam:   Constitutional: She appears well-developed. No distress.                             Alert and oriented x 3.   HEENT:  No scleral icterus. Neck supple. Moist mucus membranes.   LUNGS:  Clear to auscultation bilaterally.   HEART:  Regular rate and rhythm with physiologic heart sounds.   ABDOMEN:  Soft, non-tender, non-distended, no guarding, rigidity, or rebound with normoactive bowel sounds.   FUNDUS:  Firm, nontender below the umbilicus.   EXTREMITIES:  No cramping, claudication.  Trace edema LE. +2 distal pulses. Symmetric, full range of motion, negative [place].  NEUROLOGIC:  Grossly intact bilaterally. +2 DTR's.   PSYCH:  Mood and affect appropriate.   PERINEUM:  Intact with light " rhonda.    Labs:      Recent Results (from the past 336 hour(s))   CBC auto differential    Collection Time: 05/17/19  7:18 AM   Result Value Ref Range    WBC 10.31 3.90 - 12.70 K/uL    Hemoglobin 8.0 (L) 12.0 - 16.0 g/dL    Hematocrit 25.6 (L) 37.0 - 48.5 %    Platelets 191 150 - 350 K/uL   CBC with Auto Differential    Collection Time: 05/15/19  2:49 PM   Result Value Ref Range    WBC 8.20 3.90 - 12.70 K/uL    Hemoglobin 9.8 (L) 12.0 - 16.0 g/dL    Hematocrit 30.9 (L) 37.0 - 48.5 %    Platelets 248 150 - 350 K/uL     ABO:  O POS    Assessment:     1. Post-partum day # 1 - IUP at 39w3d s/p spontaneous vaginal delivery - progressing well  2. Anemia, iron deficiency      Plan:     Continue post-partum care  Review post-partum care instructions and precautions  Encourage ambulation  Encourage breast-feeding  Delivery findings, labs/studies, and expectations were discussed with pt.  All questions answered and pt voiced understanding.     Disposition:  As patient meets milestones, will plan to discharge tomorrow.      Octavio Donaldson MD

## 2019-05-17 NOTE — LACTATION NOTE
05/17/19 0900   Maternal Assessment   Breast Density Bilateral:;soft   Areola Bilateral:;firm   Nipples Bilateral:;everted;graspable   Maternal Infant Feeding   Maternal Emotional State assist needed;relaxed   Infant Positioning cross-cradle   Signs of Milk Transfer audible swallow;infant jaw motion present   Pain with Feeding no   Comfort Measures Before/During Feeding infant position adjusted;latch adjusted;suction broken using finger   Latch Assistance yes   baby spitting mucous now -small meconium stool changed and baby placed skin to skin with mother for feeding -has been using nipple shield overnight -nipple appears everted with firm areola -baby able to latch in cross cradle for strong sucking on and off with minimal assists -mother denies discomfort -baby pulls off a few times and complaint of some pian with re-latch -taught deep latch with chin into breast for comfort and states feels better -review some basic breastfeeding information -using breastfeeding guide and encouraged call for any assistance today

## 2019-05-17 NOTE — L&D DELIVERY NOTE
[facility]   Delivery Summary  Obstetrics & Gynecology       Date of Delivery:  2019        Preoperative Diagnosis:    Da Silva gestation at 39w3d in active labor    Postoperative Diagnosis:    Da Silva gestation at 39w3d s/p spontaneous vaginal delivery  Live infant  Procedure Performed:  Vaginal delivery    Indication (HPI):     See History & Physical for complete details.  In brief, this is a 28 y.o.  at 39w3d gestation admitted for induction of labor secondary to maternal request.  The induction was initiated with Cervidil x 1 doses followed by pitocin.  Pt progressed well through the active phase of labor and delivered a viable infant.  Maternal and fetal status was overall reassuring throughout the labor course.  AROM at 2019 ~5PM with moderate, clear fluid, no odor and had no intrapartum fever.  Pt was informed of the risks, benefits, alternatives and possible complications to a vaginal delivery.  Informed consent was obtained for delivery and blood transfusion.    Anesthesia:  Epidural     EBL:  200 mL with no replacements    Specimens:  Cord blood      Findings, Infant & Apgars:      Live female infant, APGAR 1 min: 9, 5 min: 9, transfer to well baby  Weight pending at time of note   AROM 2019 ~5PM with moderate, clear fluid, no odor, no intrapartum fever   Second degree midline laceration, repaired    Complications:  None    Delivery Summary:      Vaginal delivery of viable infant.  Infant delivered after 10 minutes of pushing.  No nuchal cord reduced.  No shoulder dystocia.  2nd degree laceration repaired with 2-0 vicryl in usual fashion.  Episiotomy was not performed.  The infant was vigorous with a strong cry.  Cord blood was collected.   The placenta delivered spontaneously intact with three vessel cord.    Uterine massage and 20 units of Pitocin IV were given until the fundus was firm.    Hemostasis was noted.    No sponges were left in the vagina.    Sponge, lap, needle, and instrument counts were correct time two.   Mother and baby were bonding well at the end of the delivery both in stable condition.   Findings and expectations were discussed with the patient.   All of pt questions were answered to her satisfaction, pt voiced understanding.      Octavio Donaldson MD

## 2019-05-17 NOTE — DISCHARGE INSTRUCTIONS
Breastfeeding discharge instructions given with First Alert form and reviewed.  Also discussed:   AAP recommendation of exclusive breastfeeding for the first 6 months of life and continued breastfeeding with the introduction of supplemental foods beyond the first year of life.  Instructed on the recommendation to delay all bottle and pacifier use until after 4 weeks of age and breastfeeding is well established.  Discussed the benefits of exclusive breastfeeding for both mother and baby.  Discussed the risks of supplementation/pacifier use on the exclusivity of breastfeeding in the first 6 months. Feed the baby at the earliest sign of hunger or comfort  o Hands to mouth, sucking motions  o Rooting or searching for something to suck on  o Dont wait for crying - it is a not a late sign of hunger; it is a sign of distress     The feedings may be 8-12 times per 24hrs and will not follow a schedule   Alternate the breast you start the feeding with, or start with the breast that feels the fullest   Switch breasts when the baby takes himself off the breast or falls asleep   Keep offering breasts until the baby looks full, no longer gives hunger signs, and stays asleep when placed on his back in the crib   If the baby is sleepy and wont wake for a feeding, put the baby skin-to-skin dressed in a diaper against the mothers bare chest   Sleep near your baby   The baby should be positioned and latched on to the breast correctly  o Chest-to-chest, chin in the breast  o Babys lips are flipped outward  o Babys mouth is stretched open wide like a shout  o Babys sucking should feel like tugging to the mother  - The baby should be drinking at the breast:  o You should hear swallowing or gulping throughout the feeding  o You should see milk on the babys lips when he comes off the breast  o Your breasts should be softer when the baby is finished feeding  o The baby should look relaxed at the end of feedings  o After  the 4th day and your milk is in:  o The babys poop should turn bright yellow and be loose, watery, and seedy  o The baby should have at least 3-4 poops the size of the palm of your hand per day  o The baby should have at least 6-8 wet diapers per day  o The urine should be light yellow in color  You should drink when you are thirsty and eat a healthy diet when you are    hungry.     Take naps to get the rest you need.   Take medications and/or drink alcohol only with permission of your obstetrician    or the babys pediatrician.  You can also call the [facility],   ([phone]), Monday-Friday, 8am-5pm Central time, to get the most   up-to-date evidence-based information on the use of medications during   pregnancy and breastfeeding.      The baby should be examined by a pediatrician at 3-5 days of age; unless ordered sooner by the pediatrician.  Once your milk comes in, the baby should be back to birth weight no later than 10-14 days of age.    After a Vaginal Birth    General Discharge Instructions  · May follow a regular diet, unless otherwise discussed with physician.  · Take showers, not baths unless otherwise discussed with physician.  · Activity as tolerated.  · No lifting or heavy exercise for 6 weeks, no driving for 2 weeks, no sexual intercourse, douching or tampons for 6 weeks  · May return to work/school as discussed with physician  · Discuss birth control with physician  · Breast care support bra worn at all times  · Lactation consultant referral number ( [phone] or [phone])    Call Your Healthcare Provider Right Away If You Have:  · A fever of 101°F or higher.  · Heavy vaginal bleeding, clots, or vaginal discharge with foul odor.  · Redness, discharge, or pain worse than you had in the hospital.  · Burning, pain, red streaks, or lumpy areas in your breasts.  · Cracks, blisters, or blood on your nipples.  · Burning or pain when you urinate.  · Persistent nausea or vomiting.  · Severe  headaches that tylenol or motrin does not relieve  · blurred vision, dizziness or fainting.  · Feelings of extreme sadness or anxiety, or a feeling that you dont want to be with your baby.  · No bowel movement for 5 days.  · Redness, warmth, swelling, or pain in the lower leg.  · Gain more than 3 pounds in 3 days   · Blood pressure >155/105      If You Had Stitches  You may have received stitches in the skin near your vagina. The stitches might have closed an episiotomy (an incision that enlarges the opening of the vagina). Or you may have needed stitches to repair torn skin. Either way, your stitches should dissolve within weeks. Until then, you can help reduce discomfort, aid healing, and reduce your risk of infection by keeping the stitches clean. These tips can help:  · Gently wipe from front to back after you urinate or have a bowel movement.  · After wiping, spray warm water on the area. Or you can have a sitz bath. This means sitting in a tub with a few inches of water in it. Then pat the area dry or use a hairdryer on a cool setting.  · Do not use soap or any solution except water on the area.  · You can take a shower unless told not to.  · Change sanitary pads at least every 2-4 hours.  · Place cold or heat packs on the area as directed by your doctors or nurses. Keep a thin towel between the pack and your skin.  · Sit on firm seats so the stitches pull less.       Follow-Up  Schedule a  follow-up exam with your healthcare provider for about 6 weeks after delivery. During this exam, your uterus and vaginal area will be checked. Contact your healthcare provider if you think you or your baby are having any problems.    After a Vaginal Birth   After having a baby, your body may be very tired. It can take time to recover from a vaginal delivery. You may stay in the hospital or birth center from 1 to 4 days. In some cases, you may be able to go home the same day.       Right after the delivery    Your temperature and blood pressure will be taken until they are stable. A nurse or other healthcare provider will observe you as you rest. You may have afterbirth pains. These are cramps caused by the uterus shrinking. Sanitary pads are used to absorb the discharge of the uterine lining. To ensure that you arent bleeding too much, the pad will be checked. And the firmness of your uterus will be checked. To do this, a nurse will gently push down on your abdomen. If you had anesthesia, youll be watched closely until you can feel and move your toes. If you have perineal pain (pain between the vagina and anus), an ice pack can help.   [place] care   While still in the hospital or birth center, youll learn how to hold and feed your baby. You will also be given instructions on how to care for your baby. This includes bathing and feeding.   Preparing to go home   You may be anxious to go home as soon as possible. Before you and your baby go home, a healthcare provider will check to be sure you are healthy enough to take care of your baby and yourself. Youre ready to go home when:   You can walk to the bathroom without help.   You can eat solid food and swallow pills (if needed).   You have no sign of infection or other health problems, including fever.   Before leaving the hospital or birth center, youll be given written instructions for home self-care after vaginal delivery. Be sure to follow these instructions carefully. If you have questions or concerns, talk about them now.   If you had stitches   You may have received stitches in the skin near your vagina. The stitches might have closed an episiotomy (an incision that enlarges the opening of the vagina). Or you may have needed stitches to repair torn skin. Either way, your stitches should dissolve within weeks. Until then, you can help reduce discomfort, aid healing, and reduce your risk of infection by keeping the stitches clean. These tips can help:   Gently  wipe from front to back after you urinate or have a bowel movement.   After wiping, spray warm water on the area. Or you can have a sitz bath. This means sitting in a tub with a few inches of water in it. Then pat the area dry or use a hairdryer on a cool setting.   Do not use soap or any solution except water on the area.   You can take a shower unless told not to.   Change sanitary pads at least every 2-4 hours.   Place cold or heat packs on the area as directed by your doctors or nurses. Keep a thin towel between the pack and your skin.   Sit on firm seats so the stitches pull less.   follow-up   Schedule a  follow-up exam with your health care provider for about 6 weeks after delivery. During this exam, your uterus and vaginal area will be checked. Contact your health care provider if you think you or your baby are having any problems.       After Delivery: When to Call the Health Care Provider   Health problems sometimes arise with you or your baby following delivery. Call your baby's health care provider or your health care provider if you see any of the signs below.       Watch your baby for these signs   Call your babys health care provider if your baby:   Has a rectal temperature of 100.4°F (38°C) or higher   Has fewer than 6 wet diapers a day (Hint: Disposable diapers may feel heavy or hard after being soaked.)   Skin or whites of the eyes appear yellow   Cries for a long time, or if it sounds as if the cries are caused by pain   Has diarrhea   Refuses two feedings in a row   Is inactive or listless   Is vomiting   Has blood in the stool or vomit   Has a rash   Has ear drainage   Has difficulty breathing   Has a seizure   Will not wake up  Trust your instincts. If you are concerned about your baby, call your health care provider.   Watch your own health for these signs   Call your own health care provider if you have:   Burning or pain in your breasts   Red streaks or hard lumpy areas in  your breasts   Problems with breast feeding   A fever of 100.4°F (38°C) or higher   Extreme tiredness or body aches, as if you have the flu   Feelings of extreme sadness or anxiety, or a feeling that you dont want to be with your baby   Abdominal pain that isnt relieved with medicine   Vaginal discharge that has a bad odor   Vaginal bleeding that soaks more than one pad per hour   If you had a  section, call for concerns about your incision site such as pain, drainage or bleeding from your incision      Incision Care After Vaginal Birth   After your babys birth, you may have needed stitches in the skin near your vagina. The stitches might have closed an episiotomy (an incision that enlarges the opening of the vagina). Or you may have needed stitches to repair torn skin. Either way, your stitches should dissolve within weeks. Until then, use this handout as a guide to help ease any discomfort and aid healing.       Keep Clean   You can reduce your risk of infection by keeping the area around the stitches clean. These hints can help:   Gently wipe from front to back after you urinate or have a bowel movement.   After wiping, spray warm water on the stitches. Pat dry. If you are too sore, just spray the area after urination and then pat dry without wiping. If you are too sore, just spray the area after urination and then pat dry without wiping.   Do not use soap or any solution except water unless instructed by your health care provider.   Change sanitary pads at least every 2-4 hours.      Eat to Stay Regular   Having bowel movements is easier if youre not constipated. Follow these tips:   Eat fresh fruit and vegetables, whole grains, and bran cereals.   Drink plenty of water.   Dont strain to have a bowel movement.   Ask your health care provider about using a stool softener. If you are breastfeeding, ask before you take any medication.      Reduce Your Discomfort   Sit in a warm bath (sitz bath).    Place cold or heat packs on your stitches. Keep a thin towel between the pack and your skin.   Sit on a firm seat so the stitches pull less.   Use medicated spray as ordered by your health care provider.  Call your doctor or health care provider if you have:   Repeated clots the size of a quarter or larger passing from the vagina   Heavy or gushing bleeding from the vagina   Discharge that has a bad odor   Severe pain in the abdomen or increased pain near your stitches   Fever or chills   No bowel movement within one week after the birth of your baby   Pain or urgency with urination, or inability to urinate        Breast Care After Birth   A few days after your babys birth, your breasts will swell with milk. They are likely to feel tender and heavy. This is normal. To help prevent breast soreness and control irritation, follow these tips:       Coping with swelling   Use cold compresses or an ice pack to help reduce the ache or pain.   Breastfeed often to keep milk from clogging your breast ducts.   If your nipples are flat from breast swelling, hand express some milk. Squeeze out a few drops of milk by massaging and compressing your breasts.   If you have swelling with pain or fever, call your health care provider.  Preventing sore nipples   Make sure baby latches on to your breast correctly. The babys tongue should always be under your nipple, and your entire areola should be in the baby's mouth.   You can let milk dry on your nipples. This dried milk can protect the skin on your nipple/breasts.   Do not use alcohol, soap, or scented cleansers on your breasts. These can cause the nipples to dry and crack.   Do not wear nursing pads that are lined with plastic. They hold in moisture and can cause chapping.   If you experience cracked or bleeding nipples, consult your doctor or a lactation consultant. He or she will ensure that your baby's latch is correct and may suggest topical treatment, such as pure  lanolin.  Choosing a good bra   Wearing the right-sized bra is especially important now. If a bra is too tight, it may cause a duct in your breast to clog and become irritated. If possible, have a salesperson help fit you for a new bra. Look for one thats 100% cotton and comfortable. Also, choose a bra with wide straps that wont dig into your back and shoulders. If youre breastfeeding, find a nursing bra that allows you to uncover one breast at a time.   If you are not breastfeeding:   Avoid stimulation of nipples   Wear a tight-fitting bra   Apply ice packs for discomfort

## 2019-05-17 NOTE — OR NURSING
C/o pressure to right lower abdomen w/ tenderness to touch.  Turned to right lateral. Encouraged use of epidural button . S.o at bedside

## 2019-05-18 VITALS
HEART RATE: 87 BPM | RESPIRATION RATE: 18 BRPM | DIASTOLIC BLOOD PRESSURE: 55 MMHG | HEIGHT: 65 IN | OXYGEN SATURATION: 98 % | TEMPERATURE: 97 F | WEIGHT: 165 LBS | BODY MASS INDEX: 27.49 KG/M2 | SYSTOLIC BLOOD PRESSURE: 99 MMHG

## 2019-05-18 PROCEDURE — 99238 HOSP IP/OBS DSCHRG MGMT 30/<: CPT | Mod: ,,, | Performed by: OBSTETRICS & GYNECOLOGY

## 2019-05-18 PROCEDURE — 25000003 PHARM REV CODE 250: Performed by: OBSTETRICS & GYNECOLOGY

## 2019-05-18 PROCEDURE — 99238 PR HOSPITAL DISCHARGE DAY,<30 MIN: ICD-10-PCS | Mod: ,,, | Performed by: OBSTETRICS & GYNECOLOGY

## 2019-05-18 RX ORDER — DOCUSATE SODIUM 100 MG/1
200 CAPSULE, LIQUID FILLED ORAL 2 TIMES DAILY PRN
Refills: 0 | COMMUNITY
Start: 2019-05-18

## 2019-05-18 RX ORDER — IBUPROFEN 600 MG/1
600 TABLET ORAL EVERY 6 HOURS PRN
Qty: 30 TABLET | Refills: 0 | Status: SHIPPED | OUTPATIENT
Start: 2019-05-18

## 2019-05-18 RX ADMIN — IBUPROFEN 600 MG: 600 TABLET ORAL at 04:05

## 2019-05-18 RX ADMIN — IBUPROFEN 600 MG: 600 TABLET ORAL at 10:05

## 2019-05-18 NOTE — ANESTHESIA POSTPROCEDURE EVALUATION
Anesthesia Post Evaluation    Patient: April Nadeen    Procedure(s) Performed: * No procedures listed *    Final Anesthesia Type: epidural  Patient location during evaluation: labor & delivery  Patient participation: Yes- Able to Participate  Level of consciousness: awake and alert and oriented  Post-procedure vital signs: reviewed and stable  Pain management: adequate  Airway patency: patent  PONV status at discharge: No PONV  Anesthetic complications: no      Cardiovascular status: blood pressure returned to baseline, stable and hemodynamically stable  Respiratory status: unassisted, room air and spontaneous ventilation  Hydration status: euvolemic  Follow-up not needed.          Vitals Value Taken Time   /58 5/17/2019  8:32 PM   Temp 36 °C (96.8 °F) 5/17/2019  8:32 PM   Pulse 94 5/17/2019  8:32 PM   Resp 20 5/17/2019  8:32 PM   SpO2 98 % 5/16/2019 11:55 PM         No case tracking events are documented in the log.      Pain/Kyler Score: Pain Rating Prior to Med Admin: 5 (5/17/2019  8:26 PM)  Pain Rating Post Med Admin: 0 (5/17/2019  8:40 AM)

## 2019-05-18 NOTE — DISCHARGE SUMMARY
[facility]  Obstetrics  Discharge Summary      Patient Name: Mirhta Senior  MRN: [id number]  Admission Date: 5/15/2019  Hospital Length of Stay: 3 days  Discharge Date and Time:  2019 6:56 AM  Attending Physician: Octavio Donaldson MD   Discharging Provider: Ted Baker MD   Primary Care Provider: Octavio Donaldson MD    HPI: 29 yo G1now  admitted to L&D on 5/15 for elective IOL    Hospital Course:   Pt underwent a   on 19.  Her postpartum course was uneventful. She is breasfeeding.  Pain well controlled.  Pt was discharged to home in stable condition.  Consults (From admission, onward)        Status Ordering Provider     Consult to Lactation  Use PRN     Provider:  (Not yet assigned)    Acknowledged OCTAVIO DONALDSON          Final Active Diagnoses:    Diagnosis Date Noted POA    PRINCIPAL PROBLEM:   (spontaneous vaginal delivery) [O80] 2019 Not Applicable    39 weeks gestation of pregnancy [Z3A.39] 05/15/2019 Not Applicable    Pregnancy with one fetus in third trimester [Z34.93] 2019 Not Applicable    Beta thalassemia minor [D56.3] 2019 Yes      Problems Resolved During this Admission:        Labs:   CBC   Recent Labs   Lab 19  0718   WBC 10.31   HGB 8.0*   HCT 25.6*          Feeding Method: breast    Immunizations     None          Delivery:    Episiotomy:     Lacerations: 2nd   Repair suture:     Repair # of packets: 1   Blood loss (ml): 0     Birth information:  YOB: 2020   Time of birth: 9:58 PM   Sex: female   Delivery type: Vaginal, Spontaneous   Gestational Age: 39w3d    Delivery Clinician:      Other providers:       Additional  information:  Forceps:    Vacuum:    Breech:    Observed anomalies      Living?:           APGARS  One minute Five minutes Ten minutes   Skin color:         Heart rate:         Grimace:         Muscle tone:         Breathing:         Totals: 9  9        Placenta: Delivered:       appearance    Pending  Diagnostic Studies:     None          Discharged Condition: good    Disposition: Home or Self Care    Follow Up:  Follow-up Information     Octavio Donaldson MD In 6 weeks.    Specialty:  Obstetrics and Gynecology  Contact information:  [address]  [address]  [place] [id number] [phone]                 Patient Instructions:      Diet Adult Regular     Notify your health care provider if you experience any of the following:  increased confusion or weakness     Notify your health care provider if you experience any of the following:  persistent dizziness, light-headedness, or visual disturbances     Notify your health care provider if you experience any of the following:  worsening rash     Notify your health care provider if you experience any of the following:  severe persistent headache     Notify your health care provider if you experience any of the following:  difficulty breathing or increased cough     Notify your health care provider if you experience any of the following:  redness, tenderness, or signs of infection (pain, swelling, redness, odor or green/yellow discharge around incision site)     Notify your health care provider if you experience any of the following:  severe uncontrolled pain     Notify your health care provider if you experience any of the following:  persistent nausea and vomiting or diarrhea     Notify your health care provider if you experience any of the following:  temperature >100.4     Activity as tolerated     Medications:  Current Discharge Medication List      START taking these medications    Details   ibuprofen (ADVIL,MOTRIN) 600 MG tablet Take 1 tablet (600 mg total) by mouth every 6 (six) hours as needed for Pain (cramping).  Qty: 30 tablet, Refills: 0         CONTINUE these medications which have CHANGED    Details   docusate sodium (COLACE) 100 MG capsule Take 2 capsules (200 mg total) by mouth 2 (two) times daily as needed for Constipation.  Refills: 0         CONTINUE  these medications which have NOT CHANGED    Details   ferrous sulfate (FEOSOL) 325 mg (65 mg iron) Tab tablet Take 1 tablet (325 mg total) by mouth 2 (two) times daily.  Qty: 60 tablet, Refills: 1    Associated Diagnoses: 27 weeks gestation of pregnancy      fluticasone (FLONASE) 50 mcg/actuation nasal spray SPRAY 1 TO 2 SPRAYS IN EACH NOSTRIL QD PRN NASAL CONGESTION  Refills: 2      prenatal vit/iron fum/folic ac (PRENATAL 1+1 ORAL) Take by mouth.         STOP taking these medications       ondansetron (ZOFRAN) 4 MG tablet Comments:   Reason for Stopping:         ondansetron (ZOFRAN-ODT) 4 MG TbDL Comments:   Reason for Stopping:         promethazine (PHENERGAN) 12.5 MG Tab Comments:   Reason for Stopping:               Ted Baker MD  Obstetrics  [facility]

## 2019-05-18 NOTE — LACTATION NOTE
05/18/19 0900   Maternal Information   Maternal Reason for Referral breastfeeding currently   Maternal Assessment   Breast Size Issue none   Breast Shape Bilateral:;round   Breast Density Bilateral:;filling   Areola Bilateral:;firm   Nipples Bilateral:;everted   Left Nipple Symptoms cracked;tender;redness   Maternal Infant Feeding   Maternal Emotional State relaxed;independent   Pain with Feeding yes   Pain Location nipple, left   Latch Assistance no   Lactation Referrals   Lactation Referrals support group;WIC (women, infants and children) program   Breastfeeding discharge instructions given to patient. Mother states that baby sometimes has trouble latching on left side and her nipple hurts when baby nursing on that side. Nipple appears reddened with compression line in center. No breakdown. Discussed ways of helping baby to latch deeply. Demonstrated hand expression and encouraged mother to hand express before latching baby to stimulate her to latch. Currently using lanolin for nipple soreness. Instructed mother to call me if the baby was ready to eat before going home. Mother verbalizes understanding of all instructions with good recall.

## 2019-05-18 NOTE — PLAN OF CARE
Problem: Adult Inpatient Plan of Care  Goal: Plan of Care Review  Outcome: Ongoing (interventions implemented as appropriate)  VSS. NAD. Ambulating and voiding. Pain well controlled with prn motrin. Discussed POC, breastfeeding, and pain management. Pt verbalizes understanding.

## 2019-05-20 ENCOUNTER — TELEPHONE (OUTPATIENT)
Dept: OBSTETRICS AND GYNECOLOGY | Facility: HOSPITAL | Age: 29
End: 2019-05-20

## 2019-05-21 ENCOUNTER — TELEPHONE (OUTPATIENT)
Dept: OBSTETRICS AND GYNECOLOGY | Facility: HOSPITAL | Age: 29
End: 2019-05-21

## 2019-06-17 ENCOUNTER — POSTPARTUM VISIT (OUTPATIENT)
Dept: OBSTETRICS AND GYNECOLOGY | Facility: CLINIC | Age: 29
End: 2019-06-17
Payer: MEDICAID

## 2019-06-17 VITALS
SYSTOLIC BLOOD PRESSURE: 106 MMHG | DIASTOLIC BLOOD PRESSURE: 72 MMHG | BODY MASS INDEX: 24.63 KG/M2 | HEIGHT: 65 IN | WEIGHT: 147.81 LBS

## 2019-06-17 PROCEDURE — 99213 OFFICE O/P EST LOW 20 MIN: CPT | Mod: PBBFAC | Performed by: OBSTETRICS & GYNECOLOGY

## 2019-06-17 PROCEDURE — 99999 PR PBB SHADOW E&M-EST. PATIENT-LVL III: CPT | Mod: PBBFAC,,, | Performed by: OBSTETRICS & GYNECOLOGY

## 2019-06-17 PROCEDURE — 99999 PR PBB SHADOW E&M-EST. PATIENT-LVL III: ICD-10-PCS | Mod: PBBFAC,,, | Performed by: OBSTETRICS & GYNECOLOGY

## 2019-06-17 PROCEDURE — 59430 PR CARE AFTER DELIVERY ONLY: ICD-10-PCS | Mod: S$PBB,,, | Performed by: OBSTETRICS & GYNECOLOGY

## 2019-06-17 NOTE — PROGRESS NOTES
"[facility]  Ambulatory Clinic  Obstetrics & Gynecology    Date of Visit:  2019    Chief Complaint:  Post-partum visit    Subjective:      April Nadeen is a 28 y.o.  who is s/p spontaneous vaginal delivery at term here for post-partum visit.  Pt has no major complaints today and has been doing well since delivery.  Pt reports baby is doing well and she is breast/bottle feeding without difficulty.  Her lochia resolved.  Pt denies any abdominal/pelvic pain, fevers, abnormal vaginal discharge, symptoms of post-partum blues or depression, breast complaints, GI or urinary compliants.  Pt will practice natural family planning.    Review of Systems:      CONSTITUTIONAL:  No fever, chills, weakness, fatigue, decreased activity  HEENT: No headaches, vision changes  LUNGS:  No shortness of breath  HEART:  No palpitations, chest pain, abnormal swelling  BREAST:  No lump, pain, redness, skin changes  GI:  No nausea, vomiting, diarrhea, constipation, abdomen pain, blood in stool  URINARY:  No dysuria, hematuria, frequency  SKIN:  No rash, bruising  NEURO:  No headache, weakness  PSYCH:  No anxiety, depression, suicidal or homicidal ideations    Objective:     /72 (BP Location: Right arm, Patient Position: Sitting, BP Method: Medium (Manual))   Ht 5' 5" (1.651 m)   Wt 67 kg (147 lb 13.1 oz)   LMP 2018 (Exact Date)   Breastfeeding? No   BMI 24.60 kg/m²      GENERAL:  NAD, A&Ox3, well nourished.  HEENT:  NCAT,moist mucus membranes, neck supple.  BREAST:  Symmetric, non-tender, no abnormal masses, skin changes, or discharge.  LUNGS:  CTA-B.  HEART:  RRR with physiologic heart sounds.  ABDOMEN:  Soft, non-tender, non-distended without any guarding, rigidity or rebound. Normoactive bowel sounds.    EXT:  Symmetric. No cramping, claudication, or edema. +2 distal pulses. FROM.  SKIN:  No rashes, good turgor & capillary refill.  NEURO:  Grossly intact bilaterally. +2 DTR.  PSYCH:  Mood " & affect appropriate.       PELVIC:  Normal female external genitalia without any obvious lesions.  Perinuem intact.  Adequate perineal body.  Normal urethral meatus.  No gross lymphadenopathy.   Vagina:  Intact with good support, lochia resolved.     Cervix:  No cervical motion tenderness, discharge, or obvious lesions.    Uterus:  Small, non-tender, normal contour.    Adnexa:  No masses, non-tender.    Rectal:  Patient declined.  No obvious external lesions.     Chaperone present for exam.    Laboratory Data:     Lab Results   Component Value Date    WBC 10.31 2019    HGB 8.0 (L) 2019    HCT 25.6 (L) 2019    MCV 64 (L) 2019     2019     O POS    Assessment:     1. 28 y.o.  s/p  at term - doing well post-partum  2. Anemia, iron deficiency      Plan:    Post-partum care instructions and precautions reviewed.  Pelvic rest x 6 wks post-partum.  Continue prenatal vitamins.  Fe/colace, anemia precautions.    F/u with PCP for health maintenance.  Encourage healthy lifestyle modifications.    Return 1 year for well woman GYN exam, or sooner as needed.  Pt voiced understanding.       Octavio Donaldson MD

## 2020-04-20 PROBLEM — Z3A.39 39 WEEKS GESTATION OF PREGNANCY: Status: RESOLVED | Noted: 2019-05-15 | Resolved: 2020-04-20
